# Patient Record
Sex: FEMALE | Race: WHITE | Employment: OTHER | URBAN - METROPOLITAN AREA
[De-identification: names, ages, dates, MRNs, and addresses within clinical notes are randomized per-mention and may not be internally consistent; named-entity substitution may affect disease eponyms.]

---

## 2022-04-26 ENCOUNTER — OFFICE VISIT (OUTPATIENT)
Dept: ORTHOPEDIC SURGERY | Age: 72
End: 2022-04-26
Payer: MEDICARE

## 2022-04-26 VITALS — BODY MASS INDEX: 31.34 KG/M2 | WEIGHT: 195 LBS | HEIGHT: 66 IN

## 2022-04-26 DIAGNOSIS — M25.562 LEFT KNEE PAIN, UNSPECIFIED CHRONICITY: ICD-10-CM

## 2022-04-26 DIAGNOSIS — M17.11 OSTEOARTHRITIS OF RIGHT KNEE, UNSPECIFIED OSTEOARTHRITIS TYPE: Primary | ICD-10-CM

## 2022-04-26 DIAGNOSIS — M17.12 OSTEOARTHRITIS OF LEFT KNEE, UNSPECIFIED OSTEOARTHRITIS TYPE: ICD-10-CM

## 2022-04-26 PROCEDURE — G8417 CALC BMI ABV UP PARAM F/U: HCPCS | Performed by: ORTHOPAEDIC SURGERY

## 2022-04-26 PROCEDURE — G8536 NO DOC ELDER MAL SCRN: HCPCS | Performed by: ORTHOPAEDIC SURGERY

## 2022-04-26 PROCEDURE — G8427 DOCREV CUR MEDS BY ELIG CLIN: HCPCS | Performed by: ORTHOPAEDIC SURGERY

## 2022-04-26 PROCEDURE — 3017F COLORECTAL CA SCREEN DOC REV: CPT | Performed by: ORTHOPAEDIC SURGERY

## 2022-04-26 PROCEDURE — 1090F PRES/ABSN URINE INCON ASSESS: CPT | Performed by: ORTHOPAEDIC SURGERY

## 2022-04-26 PROCEDURE — G8400 PT W/DXA NO RESULTS DOC: HCPCS | Performed by: ORTHOPAEDIC SURGERY

## 2022-04-26 PROCEDURE — 99204 OFFICE O/P NEW MOD 45 MIN: CPT | Performed by: ORTHOPAEDIC SURGERY

## 2022-04-26 PROCEDURE — G8432 DEP SCR NOT DOC, RNG: HCPCS | Performed by: ORTHOPAEDIC SURGERY

## 2022-04-26 PROCEDURE — 1101F PT FALLS ASSESS-DOCD LE1/YR: CPT | Performed by: ORTHOPAEDIC SURGERY

## 2022-04-26 RX ORDER — IRBESARTAN AND HYDROCHLOROTHIAZIDE 300; 12.5 MG/1; MG/1
TABLET, FILM COATED ORAL
COMMUNITY
Start: 2022-03-24

## 2022-04-26 RX ORDER — CELECOXIB 200 MG/1
CAPSULE ORAL
COMMUNITY
Start: 2022-03-01

## 2022-04-26 RX ORDER — VIT A/VIT C/VIT E/ZINC/COPPER 4296-226
CAPSULE ORAL AS DIRECTED
COMMUNITY

## 2022-04-26 RX ORDER — LUTEIN 20 MG
1 CAPSULE ORAL DAILY
COMMUNITY

## 2022-04-26 RX ORDER — SERTRALINE HYDROCHLORIDE 50 MG/1
TABLET, FILM COATED ORAL
COMMUNITY

## 2022-04-26 RX ORDER — SIMVASTATIN 20 MG/1
TABLET, FILM COATED ORAL
COMMUNITY

## 2022-04-26 RX ORDER — PANTOPRAZOLE SODIUM 40 MG/1
TABLET, DELAYED RELEASE ORAL
COMMUNITY
Start: 2022-02-08

## 2022-04-26 RX ORDER — PRAVASTATIN SODIUM 80 MG/1
1 TABLET ORAL DAILY
COMMUNITY
Start: 2021-12-29

## 2022-04-26 RX ORDER — CHOLECALCIFEROL TAB 125 MCG (5000 UNIT) 125 MCG
5000 TAB ORAL DAILY
COMMUNITY

## 2022-04-26 RX ORDER — LOSARTAN POTASSIUM 100 MG/1
TABLET ORAL
COMMUNITY

## 2022-04-26 NOTE — PATIENT INSTRUCTIONS
Knee Arthritis: Care Instructions  Your Care Instructions     Knee arthritis is a breakdown of the cartilage that cushions your knee joint. When the cartilage wears down, your bones rub against each other. This causes pain and stiffness. Knee arthritis tends to get worse with time. Treatment for knee arthritis involves reducing pain, making the leg muscles stronger, and staying at a healthy body weight. The treatment usually does not improve the health of the cartilage, but it can reduce pain and improve how well your knee works. You can take simple measures to protect your knee joints, ease your pain, and help you stay active. Follow-up care is a key part of your treatment and safety. Be sure to make and go to all appointments, and call your doctor if you are having problems. It's also a good idea to know your test results and keep a list of the medicines you take. How can you care for yourself at home? · Know that knee arthritis will cause more pain on some days than on others. · Stay at a healthy weight. Lose weight if you are overweight. When you stand up, the pressure on your knees from every pound of body weight is multiplied four times. So if you lose 10 pounds, you will reduce the pressure on your knees by 40 pounds. · Talk to your doctor or physical therapist about exercises that will help ease joint pain. ? Stretch to help prevent stiffness and to prevent injury before you exercise. You may enjoy gentle forms of yoga to help keep your knee joints and muscles flexible. ? Walk instead of jog.  ? Ride a bike. This makes your thigh muscles stronger and takes pressure off your knee. ? Wear well-fitting and comfortable shoes. ? Exercise in chest-deep water. This can help you exercise longer with less pain. ? Avoid exercises that include squatting or kneeling. They can put a lot of strain on your knees.   ? Talk to your doctor to make sure that the exercise you do is not making the arthritis worse.  · Do not sit for long periods of time. Try to walk once in a while to keep your knee from getting stiff. · Ask your doctor or physical therapist whether shoe inserts may reduce your arthritis pain. · If you can afford it, get new athletic shoes at least every year. This can help reduce the strain on your knees. · Use a device to help you do everyday activities. ? A cane or walking stick can help you keep your balance when you walk. Hold the cane or walking stick in the hand opposite the painful knee. ? If you feel like you may fall when you walk, try using crutches or a front-wheeled walker. These can prevent falls that could cause more damage to your knee. ? A knee brace may help keep your knee stable and prevent pain. ? You also can use other things to make life easier, such as a higher toilet seat and handrails in the bathtub or shower. · Take pain medicines exactly as directed. ? Do not wait until you are in severe pain. You will get better results if you take it sooner. ? If you are not taking a prescription pain medicine, take an over-the-counter medicine such as acetaminophen (Tylenol), ibuprofen (Advil, Motrin), or naproxen (Aleve). Read and follow all instructions on the label. ? Do not take two or more pain medicines at the same time unless the doctor told you to. Many pain medicines have acetaminophen, which is Tylenol. Too much acetaminophen (Tylenol) can be harmful. ? Tell your doctor if you take a blood thinner, have diabetes, or have allergies to shellfish. · Ask your doctor if you might benefit from a shot of steroid medicine into your knee. This may provide pain relief for several months. · Many people take the supplements glucosamine and chondroitin for osteoarthritis. Some people feel they help, but the medical research does not show that they work. Talk to your doctor before you take these supplements. When should you call for help?    Call your doctor now or seek immediate medical care if:    · You have sudden swelling, warmth, or pain in your knee.     · You have knee pain and a fever or rash.     · You have such bad pain that you cannot use your knee. Watch closely for changes in your health, and be sure to contact your doctor if you have any problems. Where can you learn more? Go to http://www.gray.com/  Enter W187 in the search box to learn more about \"Knee Arthritis: Care Instructions. \"  Current as of: December 20, 2021               Content Version: 13.2  © 9758-4078 [email or web address]. Care instructions adapted under license by [employer] (which disclaims liability or warranty for this information). If you have questions about a medical condition or this instruction, always ask your healthcare professional. [address] any warranty or liability for your use of this information.

## 2022-04-26 NOTE — LETTER
4/27/2022    Patient: Abdulkadir Velez   YOB: 1950   Date of Visit: 4/26/2022     Best Odell MD  [address] [facility]  [address]  [address] [place] [id number]  Via Fax: [phone]    Dear Best Odell MD,      Thank you for referring Ms. Catrachito Hazel to [address] [place] [address] AND [facility] for evaluation. My notes for this consultation are attached. If you have questions, please do not hesitate to call me. I look forward to following your patient along with you.       Sincerely,    Samson Carlson MD

## 2022-04-26 NOTE — PROGRESS NOTES
Name: Simone Marin    : 1950     Service Dept: [address] [place] [address] Sports Medicine    Chief Complaint   Patient presents with    Knee Pain        Visit Vitals  Ht 5' 6\" (1.676 m)   Wt 195 lb (88.5 kg)   BMI 31.47 kg/m²        Allergies   Allergen Reactions    Latex Rash    Penicillins Shortness of Breath and Rash    Ragweed Sneezing    Sulfa (Sulfonamide Antibiotics) Rash        Current Outpatient Medications   Medication Sig Dispense Refill    celecoxib (CELEBREX) 200 mg capsule 1 capsule with food      pantoprazole (Protonix) 40 mg tablet 1 tablet      pravastatin (PRAVACHOL) 80 mg tablet 1 Tablet daily.  cholecalciferol (VITAMIN D3) (5000 Units/125 mcg) tab tablet Take 5,000 Units by mouth daily.  irbesartan-hydroCHLOROthiazide (AVALIDE) 300-12.5 mg per tablet       losartan (COZAAR) 100 mg tablet 1 tablet      lutein-zeaxanthin 20 mg- 1,000 mcg cap Take 1 Capsule by mouth daily.  sertraline (Zoloft) 50 mg tablet 1 tablet      simvastatin (ZOCOR) 20 mg tablet 1 tablet in the evening      vit A,C,E-zinc-copper (PreserVision AREDS) cap capsule as directed. There is no problem list on file for this patient. Family History   Problem Relation Age of Onset    No Known Problems Mother     No Known Problems Father       Social History     Socioeconomic History    Marital status:    Tobacco Use    Smoking status: Former Smoker     Packs/day: 1.00     Years: 15.00     Pack years: 15.00     Quit date:      Years since quittin.3    Smokeless tobacco: Never Used   Vaping Use    Vaping Use: Never used   Substance and Sexual Activity    Alcohol use: Not Currently    Drug use: Never    Sexual activity: Not Currently      History reviewed. No pertinent surgical history.    Past Medical History:   Diagnosis Date    High cholesterol     Hypertension     Osteoporosis         I have reviewed and agree with PFSH and ROS and intake form in chart and the record furthermore I have reviewed prior medical record(s) regarding this patients care during this appointment. Review of Systems:   Patient is a pleasant appearing individual, appropriately dressed, well hydrated, well nourished, who is alert, appropriately oriented for age, and in no acute distress with a normal gait and normal affect who does not appear to be in any significant pain. Physical Exam:  Left Knee -Decrease range of motion with flexion, Knee arc of greater than 50 degrees, Some crepitation, Grossly neurovascularly intact, Good cap refill, No skin lesion, Moderate swelling, No gross instability, Some quadriceps weakness Kellgren and Josemanuel at least grade 3    Right Knee -Decrease range of motion with flexion, Some crepitation, Grossly neurovascularly intact, Good cap refill, No skin lesion, Moderate swelling, No gross instability, Some quadriceps weaknessKellgren and Josemanuel at least grade 3       Encounter Diagnoses     ICD-10-CM ICD-9-CM   1. Osteoarthritis of right knee, unspecified osteoarthritis type  M17.11 715.96   2. Osteoarthritis of left knee, unspecified osteoarthritis type  M17.12 715.96   3. Left knee pain, unspecified chronicity  M25.562 719.46       HPI:  The patient is here with a chief complaint of bilateral knee pain, throbbing, burning pain, progressively getting worse. Pain is 4/10. X-rays are positive for severe OA both knees. Assessment/Plan:  Plan will be for left total knee replacement with general medical and cardiac clearance. She does not have any history of blood clots, does not take any blood thinners, and no surgical complications in the past.  She is going to give us a date. She wants it sometime in November and then we will put it on the schedule once she confirms and goes from there.       As part of continued conservative pain management options the patient was advised to utilize Tylenol or OTC NSAIDS as long as it is not medically contraindicated. Return to Office: Follow-up and Dispositions    · Return for schedule for surgery. Scribed by Masha Bell LPN as dictated by [facility] JAJA Mcfadden MD.  Documentation True and Accepted Thor Mcfadden MD

## 2022-10-14 DIAGNOSIS — M25.562 LEFT KNEE PAIN, UNSPECIFIED CHRONICITY: ICD-10-CM

## 2022-10-14 DIAGNOSIS — M17.12 OSTEOARTHRITIS OF LEFT KNEE, UNSPECIFIED OSTEOARTHRITIS TYPE: ICD-10-CM

## 2022-11-04 DIAGNOSIS — Z96.652 STATUS POST TOTAL LEFT KNEE REPLACEMENT: Primary | ICD-10-CM

## 2022-11-07 RX ORDER — IRBESARTAN AND HYDROCHLOROTHIAZIDE 150; 12.5 MG/1; MG/1
1 TABLET, FILM COATED ORAL DAILY
COMMUNITY

## 2022-11-10 ENCOUNTER — ANESTHESIA EVENT (OUTPATIENT)
Dept: SURGERY | Age: 72
End: 2022-11-10
Payer: MEDICARE

## 2022-11-10 ENCOUNTER — HOSPITAL ENCOUNTER (OUTPATIENT)
Dept: PREADMISSION TESTING | Age: 72
Discharge: HOME | End: 2022-11-10

## 2022-11-10 ENCOUNTER — OFFICE VISIT (OUTPATIENT)
Dept: ORTHOPEDIC SURGERY | Age: 72
End: 2022-11-10
Payer: MEDICARE

## 2022-11-10 DIAGNOSIS — M17.11 OSTEOARTHRITIS OF RIGHT KNEE, UNSPECIFIED OSTEOARTHRITIS TYPE: ICD-10-CM

## 2022-11-10 DIAGNOSIS — M17.12 OSTEOARTHRITIS OF LEFT KNEE, UNSPECIFIED OSTEOARTHRITIS TYPE: ICD-10-CM

## 2022-11-10 DIAGNOSIS — M25.562 LEFT KNEE PAIN, UNSPECIFIED CHRONICITY: Primary | ICD-10-CM

## 2022-11-10 DIAGNOSIS — M25.561 RIGHT KNEE PAIN, UNSPECIFIED CHRONICITY: ICD-10-CM

## 2022-11-10 PROCEDURE — G8417 CALC BMI ABV UP PARAM F/U: HCPCS | Performed by: ORTHOPAEDIC SURGERY

## 2022-11-10 PROCEDURE — G8427 DOCREV CUR MEDS BY ELIG CLIN: HCPCS | Performed by: ORTHOPAEDIC SURGERY

## 2022-11-10 PROCEDURE — 3017F COLORECTAL CA SCREEN DOC REV: CPT | Performed by: ORTHOPAEDIC SURGERY

## 2022-11-10 PROCEDURE — G8400 PT W/DXA NO RESULTS DOC: HCPCS | Performed by: ORTHOPAEDIC SURGERY

## 2022-11-10 PROCEDURE — G8432 DEP SCR NOT DOC, RNG: HCPCS | Performed by: ORTHOPAEDIC SURGERY

## 2022-11-10 PROCEDURE — 99214 OFFICE O/P EST MOD 30 MIN: CPT | Performed by: ORTHOPAEDIC SURGERY

## 2022-11-10 PROCEDURE — 1123F ACP DISCUSS/DSCN MKR DOCD: CPT | Performed by: ORTHOPAEDIC SURGERY

## 2022-11-10 PROCEDURE — G8536 NO DOC ELDER MAL SCRN: HCPCS | Performed by: ORTHOPAEDIC SURGERY

## 2022-11-10 PROCEDURE — 1101F PT FALLS ASSESS-DOCD LE1/YR: CPT | Performed by: ORTHOPAEDIC SURGERY

## 2022-11-10 PROCEDURE — 1090F PRES/ABSN URINE INCON ASSESS: CPT | Performed by: ORTHOPAEDIC SURGERY

## 2022-11-10 RX ORDER — OXYCODONE AND ACETAMINOPHEN 5; 325 MG/1; MG/1
1 TABLET ORAL
Qty: 30 TABLET | Refills: 0 | Status: SHIPPED | OUTPATIENT
Start: 2022-11-10 | End: 2022-11-18

## 2022-11-10 RX ORDER — CLINDAMYCIN HYDROCHLORIDE 300 MG/1
300 CAPSULE ORAL EVERY 8 HOURS
Qty: 9 CAPSULE | Refills: 0 | Status: SHIPPED | OUTPATIENT
Start: 2022-11-10 | End: 2022-11-13

## 2022-11-10 NOTE — H&P (VIEW-ONLY)
Name: Mu Owens    : 1950     Service Dept: [id number] [facility] and Sports Medicine    Chief Complaint   Patient presents with    Pre-op Exam    Knee Pain        There were no vitals taken for this visit. Allergies   Allergen Reactions    Latex Rash    Aleve [Naproxen Sodium] Anaphylaxis    Nickel Rash    Penicillins Shortness of Breath and Rash    Ragweed Sneezing    Sulfa (Sulfonamide Antibiotics) Rash        Current Outpatient Medications   Medication Sig Dispense Refill    irbesartan-hydroCHLOROthiazide (AVALIDE) 150-12.5 mg per tablet Take 1 Tablet by mouth daily. celecoxib (CELEBREX) 200 mg capsule 1 capsule with food      cholecalciferol (VITAMIN D3) (5000 Units/125 mcg) tab tablet Take 5,000 Units by mouth daily. pantoprazole (PROTONIX) 40 mg tablet 1 tablet      lutein-zeaxanthin 20 mg- 1,000 mcg cap Take 1 Capsule by mouth daily. pravastatin (PRAVACHOL) 80 mg tablet 1 Tablet daily. vit A,C,E-zinc-copper (PreserVision AREDS) cap capsule as directed. There is no problem list on file for this patient. Family History   Problem Relation Age of Onset    No Known Problems Mother     No Known Problems Father       Social History     Socioeconomic History    Marital status: OTHER   Tobacco Use    Smoking status: Former     Packs/day: 1.00     Years: 15.00     Pack years: 15.00     Types: Cigarettes     Quit date:      Years since quittin.8    Smokeless tobacco: Never   Vaping Use    Vaping Use: Never used   Substance and Sexual Activity    Alcohol use: Not Currently    Drug use: Never    Sexual activity: Not Currently      History reviewed. No pertinent surgical history.    Past Medical History:   Diagnosis Date    High cholesterol     Hypertension     Osteoporosis         I have reviewed and agree with Sammy Chamberlain and MICHAEL and intake form in chart and the record furthermore I have reviewed prior medical record(s) regarding this patients care during this appointment. Review of Systems:   Patient is a pleasant appearing individual, appropriately dressed, well hydrated, well nourished, who is alert, appropriately oriented for age, and in no acute distress with a normal gait and normal affect who does not appear to be in any significant pain. Physical Exam:  Left Knee -Decrease range of motion with flexion, Knee arc of greater than 50 degrees, Some crepitation, Grossly neurovascularly intact, Good cap refill, No skin lesion, Moderate swelling, some gross instability, Some quadriceps weakness, Kellgren and Josemanuel at least grade 3    Right Knee - Full Range of Motion, No crepitation, Grossly neurovascularly intact, Good cap refill, No skin lesion, No swelling, No gross instability, No quadriceps weakness     Inpatient status: The patient has admitted to severe pain in the affected knee and due to such pain they are unable to complete activities of daily living at home and/or work on a regular basis where conservative treatments have failed. After extensive discussion with the patient, they have chosen to receive a total knee replacement with the expectation of inpatient procedure. Their dependent functional status (i.e. lack of capable support and safety at home, pain management, comorbities, or difficulty ambulating with assistive walking devices) would deem them a candidate for an inpatient stay. The patient acknowledges and understand the plan. The risks of surgery were explained to the patient which include but not limited to infection, nerve injury, artery injury, tendon injury, poor result, poor wound healing, unforeseen incidence, bleeding, infection, nerve damage, failure to improve, worsening of symptoms, morbidity, and mortality risks were explained. All questions were answered. Patient was told of no guarantees. Patient accepts all risks and benefits.  A consent for surgery will be documented and signed by the patient or a legal guardian. All questions were answered. The procedure was explained in detail. The patient was counseled about the risks of brett Covid-19 during their perioperative period and any recovery window from their procedure. The patient was made aware that brett Covid-19 may worsen their prognosis for recovering from their procedure and lend to a higher morbidity and/or mortality risk. All material risks, benefits, and reasonable alternatives including postponing the procedure were discussed. The patient DOES wish to proceed with their procedure at this time. Encounter Diagnoses     ICD-10-CM ICD-9-CM   1. Left knee pain, unspecified chronicity  M25.562 719.46   2. Osteoarthritis of left knee, unspecified osteoarthritis type  M17.12 715.96       HPI:  The patient is here with a chief complaint of left knee pain, dull, throbbing pain, diagnosed with osteoarthritis. Assessment/Plan:  Plan will be left total knee replacement and does not have any history of blood clots. No surgical complication history. Plan will be for Percocet, clindamycin and aspirin. She does not want Zofran. She will have left total knee replacement and then we will match it up. She wants to get it done the other sooner than later in February. She will match it up to the right TKA at [facility]. We will send her a referral for that, but for right now we will plan for left total knee replacement and go from there. As part of continued conservative pain management options the patient was advised to utilize Tylenol or OTC NSAIDS as long as it is not medically contraindicated. Return to Office: Follow-up and Dispositions    Return for already scheduled for surgery. Scribed by Teodoro Hogue LPN as dictated by [facility] JAJA Daniels MD.  Documentation True and Accepted Thor Daniels MD

## 2022-11-10 NOTE — PROGRESS NOTES
Name: Penny Sosa    : 1950     Service Dept: [address] [place] [address] and Sports Medicine    Chief Complaint   Patient presents with    Pre-op Exam    Knee Pain        There were no vitals taken for this visit. Allergies   Allergen Reactions    Latex Rash    Aleve [Naproxen Sodium] Anaphylaxis    Nickel Rash    Penicillins Shortness of Breath and Rash    Ragweed Sneezing    Sulfa (Sulfonamide Antibiotics) Rash        Current Outpatient Medications   Medication Sig Dispense Refill    irbesartan-hydroCHLOROthiazide (AVALIDE) 150-12.5 mg per tablet Take 1 Tablet by mouth daily. celecoxib (CELEBREX) 200 mg capsule 1 capsule with food      cholecalciferol (VITAMIN D3) (5000 Units/125 mcg) tab tablet Take 5,000 Units by mouth daily. pantoprazole (PROTONIX) 40 mg tablet 1 tablet      lutein-zeaxanthin 20 mg- 1,000 mcg cap Take 1 Capsule by mouth daily. pravastatin (PRAVACHOL) 80 mg tablet 1 Tablet daily. vit A,C,E-zinc-copper (PreserVision AREDS) cap capsule as directed. There is no problem list on file for this patient. Family History   Problem Relation Age of Onset    No Known Problems Mother     No Known Problems Father       Social History     Socioeconomic History    Marital status: OTHER   Tobacco Use    Smoking status: Former     Packs/day: 1.00     Years: 15.00     Pack years: 15.00     Types: Cigarettes     Quit date:      Years since quittin.8    Smokeless tobacco: Never   Vaping Use    Vaping Use: Never used   Substance and Sexual Activity    Alcohol use: Not Currently    Drug use: Never    Sexual activity: Not Currently      History reviewed. No pertinent surgical history.    Past Medical History:   Diagnosis Date    High cholesterol     Hypertension     Osteoporosis         I have reviewed and agree with Sammy Chamberlain and MICHAEL and intake form in chart and the record furthermore I have reviewed prior medical record(s) regarding this patients care during this appointment. Review of Systems:   Patient is a pleasant appearing individual, appropriately dressed, well hydrated, well nourished, who is alert, appropriately oriented for age, and in no acute distress with a normal gait and normal affect who does not appear to be in any significant pain. Physical Exam:  Left Knee -Decrease range of motion with flexion, Knee arc of greater than 50 degrees, Some crepitation, Grossly neurovascularly intact, Good cap refill, No skin lesion, Moderate swelling, some gross instability, Some quadriceps weakness, Kellgren and Josemanuel at least grade 3    Right Knee - Full Range of Motion, No crepitation, Grossly neurovascularly intact, Good cap refill, No skin lesion, No swelling, No gross instability, No quadriceps weakness     Inpatient status: The patient has admitted to severe pain in the affected knee and due to such pain they are unable to complete activities of daily living at home and/or work on a regular basis where conservative treatments have failed. After extensive discussion with the patient, they have chosen to receive a total knee replacement with the expectation of inpatient procedure. Their dependent functional status (i.e. lack of capable support and safety at home, pain management, comorbities, or difficulty ambulating with assistive walking devices) would deem them a candidate for an inpatient stay. The patient acknowledges and understand the plan. The risks of surgery were explained to the patient which include but not limited to infection, nerve injury, artery injury, tendon injury, poor result, poor wound healing, unforeseen incidence, bleeding, infection, nerve damage, failure to improve, worsening of symptoms, morbidity, and mortality risks were explained. All questions were answered. Patient was told of no guarantees. Patient accepts all risks and benefits.  A consent for surgery will be documented and signed by the patient or a legal guardian. All questions were answered. The procedure was explained in detail. The patient was counseled about the risks of brett Covid-19 during their perioperative period and any recovery window from their procedure. The patient was made aware that brett Covid-19 may worsen their prognosis for recovering from their procedure and lend to a higher morbidity and/or mortality risk. All material risks, benefits, and reasonable alternatives including postponing the procedure were discussed. The patient DOES wish to proceed with their procedure at this time. Encounter Diagnoses     ICD-10-CM ICD-9-CM   1. Left knee pain, unspecified chronicity  M25.562 719.46   2. Osteoarthritis of left knee, unspecified osteoarthritis type  M17.12 715.96       HPI:  The patient is here with a chief complaint of left knee pain, dull, throbbing pain, diagnosed with osteoarthritis. Assessment/Plan:  Plan will be left total knee replacement and does not have any history of blood clots. No surgical complication history. Plan will be for Percocet, clindamycin and aspirin. She does not want Zofran. She will have left total knee replacement and then we will match it up. She wants to get it done the other sooner than later in February. She will match it up to the right TKA at [facility]. We will send her a referral for that, but for right now we will plan for left total knee replacement and go from there. As part of continued conservative pain management options the patient was advised to utilize Tylenol or OTC NSAIDS as long as it is not medically contraindicated. Return to Office: Follow-up and Dispositions    Return for already scheduled for surgery. Scribed by Shannan Sanford LPN as dictated by [facility] JAJA Donato MD.  Documentation True and Accepted Thor Donato MD

## 2022-11-10 NOTE — PATIENT INSTRUCTIONS

## 2022-11-10 NOTE — LETTER
11/11/2022    Patient: Penny Sosa   YOB: 1950   Date of Visit: 11/10/2022     Carlon Ganser, MD  [address] [facility]  [address]  [address] [facility] [address]  Via Fax: [phone]    Dear Carlon Ganser, MD,      Thank you for referring Ms. Chris Hawkins to [address] [place] [address] [facility] for evaluation. My notes for this consultation are attached. If you have questions, please do not hesitate to call me. I look forward to following your patient along with you.       Sincerely,    Charles Perez MD

## 2022-11-17 ENCOUNTER — ANESTHESIA (OUTPATIENT)
Dept: SURGERY | Age: 72
End: 2022-11-17
Payer: MEDICARE

## 2022-11-17 ENCOUNTER — HOSPITAL ENCOUNTER (OUTPATIENT)
Age: 72
Discharge: HOME | End: 2022-11-17
Attending: ORTHOPAEDIC SURGERY | Admitting: ORTHOPAEDIC SURGERY
Payer: MEDICARE

## 2022-11-17 ENCOUNTER — APPOINTMENT (OUTPATIENT)
Dept: GENERAL RADIOLOGY | Age: 72
End: 2022-11-17
Attending: NURSE PRACTITIONER
Payer: MEDICARE

## 2022-11-17 VITALS
DIASTOLIC BLOOD PRESSURE: 74 MMHG | SYSTOLIC BLOOD PRESSURE: 148 MMHG | HEIGHT: 66 IN | OXYGEN SATURATION: 99 % | BODY MASS INDEX: 31.5 KG/M2 | RESPIRATION RATE: 14 BRPM | WEIGHT: 196 LBS | TEMPERATURE: 97.6 F | HEART RATE: 60 BPM

## 2022-11-17 PROBLEM — M17.9 OA (OSTEOARTHRITIS) OF KNEE: Status: ACTIVE | Noted: 2022-11-17

## 2022-11-17 PROCEDURE — 77030011266 HC ELECTRD BLD INSL COVD -A: Performed by: ORTHOPAEDIC SURGERY

## 2022-11-17 PROCEDURE — 2709999900 HC NON-CHARGEABLE SUPPLY: Performed by: ORTHOPAEDIC SURGERY

## 2022-11-17 PROCEDURE — 74011250636 HC RX REV CODE- 250/636: Performed by: NURSE ANESTHETIST, CERTIFIED REGISTERED

## 2022-11-17 PROCEDURE — 77030013708 HC HNDPC SUC IRR PULS STRY –B: Performed by: ORTHOPAEDIC SURGERY

## 2022-11-17 PROCEDURE — 77030041690 HC SYS PINNING KN JNJ -D: Performed by: ORTHOPAEDIC SURGERY

## 2022-11-17 PROCEDURE — 76060000034 HC ANESTHESIA 1.5 TO 2 HR: Performed by: ORTHOPAEDIC SURGERY

## 2022-11-17 PROCEDURE — 64447 NJX AA&/STRD FEMORAL NRV IMG: CPT | Performed by: NURSE ANESTHETIST, CERTIFIED REGISTERED

## 2022-11-17 PROCEDURE — 77030006835 HC BLD SAW SAG STRY -B: Performed by: ORTHOPAEDIC SURGERY

## 2022-11-17 PROCEDURE — 77030040361 HC SLV COMPR DVT MDII -B: Performed by: ORTHOPAEDIC SURGERY

## 2022-11-17 PROCEDURE — 74011250637 HC RX REV CODE- 250/637: Performed by: NURSE ANESTHETIST, CERTIFIED REGISTERED

## 2022-11-17 PROCEDURE — 77030038692 HC WND DEB SYS IRMX -B: Performed by: ORTHOPAEDIC SURGERY

## 2022-11-17 PROCEDURE — 74011000250 HC RX REV CODE- 250: Performed by: ORTHOPAEDIC SURGERY

## 2022-11-17 PROCEDURE — 77030013079 HC BLNKT BAIR HGGR 3M -A: Performed by: NURSE ANESTHETIST, CERTIFIED REGISTERED

## 2022-11-17 PROCEDURE — C1713 ANCHOR/SCREW BN/BN,TIS/BN: HCPCS | Performed by: ORTHOPAEDIC SURGERY

## 2022-11-17 PROCEDURE — 76210000006 HC OR PH I REC 0.5 TO 1 HR: Performed by: ORTHOPAEDIC SURGERY

## 2022-11-17 PROCEDURE — 97116 GAIT TRAINING THERAPY: CPT

## 2022-11-17 PROCEDURE — 77030011267 HC ELECTRD BLD COVD -A: Performed by: ORTHOPAEDIC SURGERY

## 2022-11-17 PROCEDURE — 74011250636 HC RX REV CODE- 250/636: Performed by: NURSE PRACTITIONER

## 2022-11-17 PROCEDURE — C1776 JOINT DEVICE (IMPLANTABLE): HCPCS | Performed by: ORTHOPAEDIC SURGERY

## 2022-11-17 PROCEDURE — 74011000250 HC RX REV CODE- 250: Performed by: NURSE ANESTHETIST, CERTIFIED REGISTERED

## 2022-11-17 PROCEDURE — 64450 NJX AA&/STRD OTHER PN/BRANCH: CPT | Performed by: NURSE ANESTHETIST, CERTIFIED REGISTERED

## 2022-11-17 PROCEDURE — 77030040393 HC DRSG OPTIFOAM GENT MDII -B: Performed by: ORTHOPAEDIC SURGERY

## 2022-11-17 PROCEDURE — 76210000022 HC REC RM PH II 1.5 TO 2 HR: Performed by: ORTHOPAEDIC SURGERY

## 2022-11-17 PROCEDURE — 77030039147 HC PWDR HEMSTS SURGICEL JNJ -D: Performed by: ORTHOPAEDIC SURGERY

## 2022-11-17 PROCEDURE — 77030029372 HC ADH SKN CLSR PRINEO J&J -C: Performed by: ORTHOPAEDIC SURGERY

## 2022-11-17 PROCEDURE — 97530 THERAPEUTIC ACTIVITIES: CPT

## 2022-11-17 PROCEDURE — 77030006812 HC BLD SAW RECIP STRY -B: Performed by: ORTHOPAEDIC SURGERY

## 2022-11-17 PROCEDURE — 76010000153 HC OR TIME 1.5 TO 2 HR: Performed by: ORTHOPAEDIC SURGERY

## 2022-11-17 PROCEDURE — 77030031140 HC SUT VCRL3 J&J -A: Performed by: ORTHOPAEDIC SURGERY

## 2022-11-17 PROCEDURE — 73560 X-RAY EXAM OF KNEE 1 OR 2: CPT

## 2022-11-17 PROCEDURE — 74011000272 HC RX REV CODE- 272: Performed by: ORTHOPAEDIC SURGERY

## 2022-11-17 PROCEDURE — 77030000032 HC CUF TRNQT ZIMM -B: Performed by: ORTHOPAEDIC SURGERY

## 2022-11-17 PROCEDURE — 77030007866 HC KT SPN ANES BBMI -B: Performed by: NURSE ANESTHETIST, CERTIFIED REGISTERED

## 2022-11-17 PROCEDURE — 76942 ECHO GUIDE FOR BIOPSY: CPT | Performed by: NURSE ANESTHETIST, CERTIFIED REGISTERED

## 2022-11-17 PROCEDURE — 77030031139 HC SUT VCRL2 J&J -A: Performed by: ORTHOPAEDIC SURGERY

## 2022-11-17 PROCEDURE — 77030018673: Performed by: ORTHOPAEDIC SURGERY

## 2022-11-17 PROCEDURE — 97161 PT EVAL LOW COMPLEX 20 MIN: CPT

## 2022-11-17 PROCEDURE — 74011250637 HC RX REV CODE- 250/637: Performed by: NURSE PRACTITIONER

## 2022-11-17 PROCEDURE — 74011000258 HC RX REV CODE- 258: Performed by: NURSE ANESTHETIST, CERTIFIED REGISTERED

## 2022-11-17 DEVICE — KNEE K1 TOT HEMI STD CEM IMPL CAPPED SYNTHES: Type: IMPLANTABLE DEVICE | Site: KNEE | Status: FUNCTIONAL

## 2022-11-17 DEVICE — ATTUNE KNEE SYSTEM TIBIAL BASE ROTATING PLATFORM SIZE 5 CEMENTED
Type: IMPLANTABLE DEVICE | Site: KNEE | Status: FUNCTIONAL
Brand: ATTUNE

## 2022-11-17 DEVICE — ATTUNE KNEE SYSTEM TIBIAL INSERT ROTATING PLATFORM POSTERIOR STABILIZED 5 15MM AOX
Type: IMPLANTABLE DEVICE | Site: KNEE | Status: FUNCTIONAL
Brand: ATTUNE

## 2022-11-17 DEVICE — ATTUNE PATELLA MEDIALIZED DOME 35MM CEMENTED AOX
Type: IMPLANTABLE DEVICE | Site: KNEE | Status: FUNCTIONAL
Brand: ATTUNE

## 2022-11-17 DEVICE — ATTUNE KNEE SYSTEM FEMORAL POSTERIOR STABILIZED NARROW SIZE 5N LEFT CEMENTED
Type: IMPLANTABLE DEVICE | Site: KNEE | Status: FUNCTIONAL
Brand: ATTUNE

## 2022-11-17 DEVICE — CEMENT BNE GENTAMC HV R+G 40GM -- PALACOS R+G 5036964: Type: IMPLANTABLE DEVICE | Site: KNEE | Status: FUNCTIONAL

## 2022-11-17 RX ORDER — SODIUM CHLORIDE 0.9 % (FLUSH) 0.9 %
5-40 SYRINGE (ML) INJECTION AS NEEDED
Status: CANCELLED | OUTPATIENT
Start: 2022-11-17

## 2022-11-17 RX ORDER — MIDAZOLAM HYDROCHLORIDE 1 MG/ML
INJECTION, SOLUTION INTRAMUSCULAR; INTRAVENOUS
Status: SHIPPED | OUTPATIENT
Start: 2022-11-17 | End: 2022-11-17

## 2022-11-17 RX ORDER — CLINDAMYCIN PHOSPHATE 900 MG/50ML
900 INJECTION, SOLUTION INTRAVENOUS ONCE
Status: COMPLETED | OUTPATIENT
Start: 2022-11-17 | End: 2022-11-17

## 2022-11-17 RX ORDER — BUPIVACAINE HYDROCHLORIDE 5 MG/ML
INJECTION, SOLUTION EPIDURAL; INTRACAUDAL
Status: SHIPPED | OUTPATIENT
Start: 2022-11-17 | End: 2022-11-17

## 2022-11-17 RX ORDER — DEXAMETHASONE SODIUM PHOSPHATE 4 MG/ML
INJECTION, SOLUTION INTRA-ARTICULAR; INTRALESIONAL; INTRAMUSCULAR; INTRAVENOUS; SOFT TISSUE
Status: SHIPPED | OUTPATIENT
Start: 2022-11-17 | End: 2022-11-17

## 2022-11-17 RX ORDER — SODIUM CHLORIDE 0.9 % (FLUSH) 0.9 %
5-40 SYRINGE (ML) INJECTION AS NEEDED
Status: DISCONTINUED | OUTPATIENT
Start: 2022-11-17 | End: 2022-11-17 | Stop reason: HOSPADM

## 2022-11-17 RX ORDER — SODIUM CHLORIDE 0.9 % (FLUSH) 0.9 %
5-40 SYRINGE (ML) INJECTION EVERY 8 HOURS
Status: CANCELLED | OUTPATIENT
Start: 2022-11-17

## 2022-11-17 RX ORDER — BUPIVACAINE HYDROCHLORIDE 2.5 MG/ML
INJECTION, SOLUTION INFILTRATION; PERINEURAL AS NEEDED
Status: DISCONTINUED | OUTPATIENT
Start: 2022-11-17 | End: 2022-11-17 | Stop reason: HOSPADM

## 2022-11-17 RX ORDER — TRANEXAMIC ACID 100 MG/ML
INJECTION, SOLUTION INTRAVENOUS AS NEEDED
Status: DISCONTINUED | OUTPATIENT
Start: 2022-11-17 | End: 2022-11-17 | Stop reason: HOSPADM

## 2022-11-17 RX ORDER — MIDAZOLAM HYDROCHLORIDE 1 MG/ML
INJECTION, SOLUTION INTRAMUSCULAR; INTRAVENOUS
Status: COMPLETED | OUTPATIENT
Start: 2022-11-17 | End: 2022-11-17

## 2022-11-17 RX ORDER — ASPIRIN 325 MG
325 TABLET, DELAYED RELEASE (ENTERIC COATED) ORAL 2 TIMES DAILY
Status: CANCELLED | OUTPATIENT
Start: 2022-11-18

## 2022-11-17 RX ORDER — SENNOSIDES 8.6 MG/1
1 TABLET ORAL 2 TIMES DAILY
Status: CANCELLED | OUTPATIENT
Start: 2022-11-17

## 2022-11-17 RX ORDER — NALOXONE HYDROCHLORIDE 0.4 MG/ML
0.4 INJECTION, SOLUTION INTRAMUSCULAR; INTRAVENOUS; SUBCUTANEOUS AS NEEDED
Status: CANCELLED | OUTPATIENT
Start: 2022-11-17

## 2022-11-17 RX ORDER — ACETAMINOPHEN 500 MG
1000 TABLET ORAL ONCE
Status: COMPLETED | OUTPATIENT
Start: 2022-11-17 | End: 2022-11-17

## 2022-11-17 RX ORDER — ONDANSETRON 2 MG/ML
4 INJECTION INTRAMUSCULAR; INTRAVENOUS ONCE
Status: DISCONTINUED | OUTPATIENT
Start: 2022-11-17 | End: 2022-11-17 | Stop reason: HOSPADM

## 2022-11-17 RX ORDER — SODIUM CHLORIDE, SODIUM LACTATE, POTASSIUM CHLORIDE, CALCIUM CHLORIDE 600; 310; 30; 20 MG/100ML; MG/100ML; MG/100ML; MG/100ML
25 INJECTION, SOLUTION INTRAVENOUS CONTINUOUS
Status: DISCONTINUED | OUTPATIENT
Start: 2022-11-17 | End: 2022-11-17 | Stop reason: HOSPADM

## 2022-11-17 RX ORDER — GABAPENTIN 300 MG/1
300 CAPSULE ORAL ONCE
Status: COMPLETED | OUTPATIENT
Start: 2022-11-17 | End: 2022-11-17

## 2022-11-17 RX ORDER — PROPOFOL 10 MG/ML
INJECTION, EMULSION INTRAVENOUS
Status: DISCONTINUED | OUTPATIENT
Start: 2022-11-17 | End: 2022-11-17 | Stop reason: HOSPADM

## 2022-11-17 RX ORDER — OXYCODONE AND ACETAMINOPHEN 10; 325 MG/1; MG/1
1 TABLET ORAL
Status: DISCONTINUED | OUTPATIENT
Start: 2022-11-17 | End: 2022-11-17 | Stop reason: HOSPADM

## 2022-11-17 RX ORDER — SODIUM CHLORIDE 0.9 % (FLUSH) 0.9 %
5-40 SYRINGE (ML) INJECTION EVERY 8 HOURS
Status: DISCONTINUED | OUTPATIENT
Start: 2022-11-17 | End: 2022-11-17 | Stop reason: HOSPADM

## 2022-11-17 RX ORDER — KETOROLAC TROMETHAMINE 30 MG/ML
15 INJECTION, SOLUTION INTRAMUSCULAR; INTRAVENOUS
Status: DISCONTINUED | OUTPATIENT
Start: 2022-11-17 | End: 2022-11-17 | Stop reason: HOSPADM

## 2022-11-17 RX ORDER — FENTANYL CITRATE 50 UG/ML
50 INJECTION, SOLUTION INTRAMUSCULAR; INTRAVENOUS AS NEEDED
Status: DISCONTINUED | OUTPATIENT
Start: 2022-11-17 | End: 2022-11-17 | Stop reason: HOSPADM

## 2022-11-17 RX ORDER — ONDANSETRON 2 MG/ML
4 INJECTION INTRAMUSCULAR; INTRAVENOUS
Status: DISCONTINUED | OUTPATIENT
Start: 2022-11-17 | End: 2022-11-17 | Stop reason: HOSPADM

## 2022-11-17 RX ORDER — SODIUM CHLORIDE, SODIUM LACTATE, POTASSIUM CHLORIDE, CALCIUM CHLORIDE 600; 310; 30; 20 MG/100ML; MG/100ML; MG/100ML; MG/100ML
INJECTION, SOLUTION INTRAVENOUS
Status: DISCONTINUED | OUTPATIENT
Start: 2022-11-17 | End: 2022-11-17 | Stop reason: HOSPADM

## 2022-11-17 RX ORDER — BUPIVACAINE HYDROCHLORIDE 7.5 MG/ML
INJECTION, SOLUTION INTRASPINAL
Status: SHIPPED | OUTPATIENT
Start: 2022-11-17 | End: 2022-11-17

## 2022-11-17 RX ORDER — DIPHENHYDRAMINE HYDROCHLORIDE 50 MG/ML
12.5 INJECTION, SOLUTION INTRAMUSCULAR; INTRAVENOUS
Status: CANCELLED | OUTPATIENT
Start: 2022-11-17

## 2022-11-17 RX ORDER — OXYCODONE AND ACETAMINOPHEN 5; 325 MG/1; MG/1
2 TABLET ORAL
Status: DISCONTINUED | OUTPATIENT
Start: 2022-11-17 | End: 2022-11-17 | Stop reason: HOSPADM

## 2022-11-17 RX ORDER — LIDOCAINE HYDROCHLORIDE 10 MG/ML
INJECTION, SOLUTION EPIDURAL; INFILTRATION; INTRACAUDAL; PERINEURAL
Status: SHIPPED | OUTPATIENT
Start: 2022-11-17 | End: 2022-11-17

## 2022-11-17 RX ORDER — FACIAL-BODY WIPES
10 EACH TOPICAL DAILY PRN
Status: CANCELLED | OUTPATIENT
Start: 2022-11-17

## 2022-11-17 RX ORDER — ACETAMINOPHEN 325 MG/1
650 TABLET ORAL
Status: CANCELLED | OUTPATIENT
Start: 2022-11-17

## 2022-11-17 RX ADMIN — GABAPENTIN 300 MG: 300 CAPSULE ORAL at 09:23

## 2022-11-17 RX ADMIN — BUPIVACAINE HYDROCHLORIDE 20 ML: 5 INJECTION, SOLUTION EPIDURAL; INTRACAUDAL; PERINEURAL at 12:00

## 2022-11-17 RX ADMIN — PHENYLEPHRINE HYDROCHLORIDE 100 MCG: 10 INJECTION INTRAVENOUS at 12:33

## 2022-11-17 RX ADMIN — PHENYLEPHRINE HYDROCHLORIDE 100 MCG: 10 INJECTION INTRAVENOUS at 13:05

## 2022-11-17 RX ADMIN — ACETAMINOPHEN 1000 MG: 500 TABLET ORAL at 09:23

## 2022-11-17 RX ADMIN — DEXAMETHASONE SODIUM PHOSPHATE 4 MG: 4 INJECTION, SOLUTION INTRAMUSCULAR; INTRAVENOUS at 12:00

## 2022-11-17 RX ADMIN — PHENYLEPHRINE HYDROCHLORIDE 100 MCG: 10 INJECTION INTRAVENOUS at 13:27

## 2022-11-17 RX ADMIN — TRANEXAMIC ACID 1 G: 1 INJECTION, SOLUTION INTRAVENOUS at 12:34

## 2022-11-17 RX ADMIN — MIDAZOLAM HYDROCHLORIDE 4 MG: 2 INJECTION, SOLUTION INTRAMUSCULAR; INTRAVENOUS at 12:00

## 2022-11-17 RX ADMIN — CLINDAMYCIN PHOSPHATE 900 MG: 900 INJECTION, SOLUTION INTRAVENOUS at 12:27

## 2022-11-17 RX ADMIN — BUPIVACAINE HYDROCHLORIDE WITH DEXTROSE 13.5 MG: 7.5 INJECTION SUBARACHNOID at 12:18

## 2022-11-17 RX ADMIN — OXYCODONE AND ACETAMINOPHEN 1 TABLET: 5; 325 TABLET ORAL at 14:59

## 2022-11-17 RX ADMIN — ONDANSETRON 4 MG: 2 INJECTION INTRAMUSCULAR; INTRAVENOUS at 14:59

## 2022-11-17 RX ADMIN — PROPOFOL 20 MCG/KG/MIN: 10 INJECTION, EMULSION INTRAVENOUS at 12:34

## 2022-11-17 RX ADMIN — MIDAZOLAM HYDROCHLORIDE 4 MG: 1 INJECTION, SOLUTION INTRAMUSCULAR; INTRAVENOUS at 12:18

## 2022-11-17 RX ADMIN — SODIUM CHLORIDE, POTASSIUM CHLORIDE, SODIUM LACTATE AND CALCIUM CHLORIDE 25 ML/HR: 600; 310; 30; 20 INJECTION, SOLUTION INTRAVENOUS at 09:21

## 2022-11-17 RX ADMIN — TRANEXAMIC ACID 1 G: 1 INJECTION, SOLUTION INTRAVENOUS at 13:07

## 2022-11-17 RX ADMIN — SODIUM CHLORIDE, POTASSIUM CHLORIDE, SODIUM LACTATE AND CALCIUM CHLORIDE: 600; 310; 30; 20 INJECTION, SOLUTION INTRAVENOUS at 12:12

## 2022-11-17 RX ADMIN — LIDOCAINE HYDROCHLORIDE 30 MG: 10 INJECTION, SOLUTION EPIDURAL; INFILTRATION; INTRACAUDAL; PERINEURAL at 12:18

## 2022-11-17 NOTE — PROGRESS NOTES
Problem: Mobility Impaired (Adult and Pediatric)  Goal: *Acute Goals and Plan of Care (Insert Text)  Description: Pt ambulates with MOD (I) and navigates stairs with MOD (I) . PLOF: Community ambulator, no AD, (I) ADLs    Outcome: Resolved/Met     Problem: Patient Education: Go to Patient Education Activity  Goal: Patient/Family Education  Outcome: Resolved/Met   PHYSICAL THERAPY EVALUATION AND DISCHARGE    Patient: Jennifer Dunham (53 y.o. female)  Date: 2022   Start Time: 152   Stop Time: 65  $$ Initial PT Evaluation: Low Complex 20 Min  $$ Gait Trainin-22 mins  $$ Therapeutic Activity: 8-22 mins  Primary Diagnosis: Osteoarthritis of left knee, unspecified osteoarthritis type [M17.12]  OA (osteoarthritis) of knee [M17.9]  Procedure(s) (LRB):  LEFT TKA (Left) Day of Surgery   Precautions:  WBAT    ASSESSMENT :  Based on the objective data described below, the patient presents s/p L TKA and she is WBAT. She is able to ambulate with a RW with MOD (I) and she is able to navigate stairs with MOD (I), following stairs she c/o feeling very tired with BP noted to be low, nursing notified. She is educated on a HEP and tolerates well. She can return home with outpatient P.T. Patient does not require further skilled intervention at this level of care. PLAN :  Recommendations and Planned Interventions:   No formal PT needs identified at this time. Discharge Recommendations: Outpatient  AM-PAC:   Further Equipment Recommendations for Discharge: RW     SUBJECTIVE:   Patient states my leg is really hurting.     OBJECTIVE DATA SUMMARY:     Past Medical History:   Diagnosis Date    High cholesterol     Hypertension     Osteoporosis    History reviewed. No pertinent surgical history.   Barriers to Learning/Limitations: None  Compensate with: N/A  Home Situation:   Home Situation  Home Environment: Private residence  # Steps to Enter: 4  Rails to Enter: Yes  Hand Rails : Bilateral  One/Two Story Residence: One story  Living Alone: Yes  Support Systems: Child(luzma), Other Family Member(s)  Patient Expects to be Discharged to[de-identified] Home with outpatient services  Current DME Used/Available at Home: None  Critical Behavior:  Neurologic State: Alert  Orientation Level: Oriented X4     Skin Integrity: Incision (comment) (left knee)  Skin Integumentary  Skin Integrity: Incision (comment) (left knee)     Strength:    Strength: Generally decreased, functional (L knee 4/5, SLR 1420, 2 hours after spinal)     Tone & Sensation:   Tone: Normal  Sensation: Impaired (pelvic area numb)  Coordination:  Coordination: Within functional limits  Range Of Motion:   AROM: Generally decreased, functional (L knee 10-40)  PROM: Generally decreased, functional (L knee 7-41)  Posture:  Posture (WDL): Within defined limits     Functional Mobility:  Bed Mobility:  Rolling: Modified independent  Supine to Sit: Modified independent  Sit to Supine: Modified independent  Scooting: Modified independent  Transfers:  Sit to Stand: Modified independent  Stand to Sit: Modified independent  Toilet Transfers : Minimum assistance (total assist for brief management)     Balance:   Sitting: Intact  Standing: Intact  Ambulation/Gait Training:  Distance (ft): 60 Feet (ft) (plus another 40' and 20')  Assistive Device: Walker, rolling  Ambulation - Level of Assistance: Modified independent     Gait Description (WDL): Exceptions to WDL  Gait Abnormalities: Antalgic     Left Side Weight Bearing: As tolerated  Stairs:  Number of Stairs Trained: 3 (nonreciprocating)  Stairs - Level of Assistance: Modified independent  Rail Use: Both      AM-PAC:  24/24; Current research shows that an AM-PAC score of 17 or less is typically not associated with a discharge to the patient's home setting, whereas a score of 18 or greater is typically associated with a discharge to the patient's home setting. Today's TX:   Pt is able to ambulate with a RW with MOD (I).  She is able to navigate stairs with MOD (I). She performs toileting with min assist for transfers and total assist for brief management. She is educated on a HEP and states understanding. Pain:  Pain level pre-treatment: 7/10   Pain level post-treatment: 7/10  Pain Location: L knee  Pain Intervention(s): Medication (see MAR); Rest, Ice, Repositioning   Response to intervention: Nurse notified, See doc flow    Activity Tolerance:   Fair     11/17/22 1610   Vital Signs   Pulse (Heart Rate) 66   BP (!) 101/56   MAP (Calculated) 71     Please refer to the flowsheet for vital signs taken during this treatment. After treatment:   []         Patient left in no apparent distress sitting up in chair  [x]         Patient left in no apparent distress in bed  []         Call bell left within reach  [x]         Nursing notified  []         Caregiver present  []         Bed alarm activated  []         SCDs applied    COMMUNICATION/EDUCATION:   [x]         Role of Physical Therapy in the acute care setting. [x]         Fall prevention education was provided and the patient/caregiver indicated understanding. [x]         Patient/family have participated as able in goal setting and plan of care. [x]         Patient/family agree to work toward stated goals and plan of care. []         Patient understands intent and goals of therapy, but is neutral about his/her participation. []         Patient is unable to participate in goal setting/plan of care: ongoing with therapy staff.  []         Other:     Thank you for this referral.  Bird Chamorro, PT, DPT   Time Calculation: 43 mins

## 2022-11-17 NOTE — ANESTHESIA PROCEDURE NOTES
Spinal Block    Start time: 11/17/2022 12:12 PM  End time: 11/17/2022 12:20 PM  Performed by: David Rice CRNA  Authorized by: David Rice CRNA     Pre-procedure: Indications: at surgeon's request and primary anesthetic  Preanesthetic Checklist: patient identified, risks and benefits discussed, anesthesia consent, site marked, patient being monitored, timeout performed and fire risk safety assessment completed and verbalized    Timeout Time: 12:12 KANYD Greene)      Spinal Block:   Patient Position:  Seated  Prep Region:  Lumbar  Prep: Betadine      Location:  L2-3  Technique:  Single shot  Local: midazolam (VERSED) injection sedation - IntraVENous   4 mg - 11/17/2022 12:18:00 PM  lidocaine (PF) (XYLOCAINE) 10 mg/mL (1 %) IntraDERMAL - IntraDERMal   30 mg - 11/17/2022 12:18:00 PM  bupivacaine 0.75% in dextrose 8.25% preserv-free (SENSORCAINE) Intrathecal - Intrathecal   13.5 mg - 11/17/2022 12:18:00 PM  Local Dose (mL):  1.8  Med Admin Time: 11/17/2022 12:18 PM    Needle:   Needle Type:   Omnique  Needle Gauge:  25 G  Attempts:  1      Events: CSF confirmed, no blood with aspiration and no paresthesia        Assessment:  Insertion:  Uncomplicated  Patient tolerance:  Patient tolerated the procedure well with no immediate complications

## 2022-11-17 NOTE — DISCHARGE INSTRUCTIONS
TOTAL KNEE REPLACEMENT DISCHARGE INFORMATION    You have undergone a Total Knee Replacement. The following list is to provide you with some expectations over the next week upon your discharge from the hospital.     Please begin Aspirin 325mg every 12 hours (twice daily) starting tomorrow as directed until Dr. Karina Frank instructs you to discontinue it. If you are not sure which blood thinner to take please contact Dr. Cristine Rose office next business day for clarification. Please be sure to continue your thigh-high compression stockings on both sides until instructed to discontinue them. Over the course of the next week, you should continue thigh high stockings on the operative leg, DO NOT GET THE INCISION WET until instructed to do so. Please make sure the stockings on the operative leg are pulled up all the way to the thigh to prevent any creases which may result in abrasions or creases in the skin. If the stockings are creating creases resulting in abrasions or blistering on the operative leg please remove the stockings. You may take the stockings off on the nonoperative leg once you arrive home. You may notice some bruising on your thigh and it may extend all the way to the ankles. That is perfectly normal early on. You may experience a clicking noise in your knee and that is normal because of the artificial knee. It is important to remember if you have any surgical procedure including dental procedures which may result in bleeding that an antibiotic 1 hour before the procedure will be required. Please let the provider performing the procedure know that you have artificial joint. If an antibiotic is not given by them please call our office and give us at least 5 business days to get you the appropriate antibiotics if needed. This rule applies indefinitely. If an Ace wrap is placed on your knee you may remove the Ace wrap only 48 hours after your surgery. We will leave the stockings on.   During the course of your  over the next week, should you experience fevers of 101.5 F, a white drainage from the incision, extreme redness around the incision, or the incision begins to have a pungent smell; Please call our office or page Dr. Ruby Staton whose numbers are provided in your discharge paperwork. To Page Dr. Ruby Staton please call [phone] and dial [phone]. Have the  page whomever is on call for Orthopedics. These are signs of infection and it should be addressed immediately. Please do not drive until instructed to do so. If you need a refill on pain medication please allow at least 2 business days notice for any refills. Immediate refill request may not be possible. Medication refill requests will not be addressed during non-business hours. Please do not page the on-call provider for pain medication refills after hours. It is very important for you to begin your Outpatient Physical Therapy within a couple days of the day of your discharge and your appointment should have been set up. If your physical therapy has not been set up please call our office the next business day for assistance. Details provided in a separate sheet. Remove ace wrap in 48 hrs after surgery but keep stockings on. You may remove the stocking and keep the stocking off on the nonoperative leg. Finish all antibiotics, start the antibiotics as soon as you go home if you have prescribed antibiotics. 14.  You should perform your daily home exercises at least 4 times a day 30 minutes each time. Perform foot pumps on both feet at least 10 times every 15 minutes while awake. This helps prevent swelling in the leg and can help prevent blood clots in the leg. On the operative leg if you have significant swelling you can also lay down flat and put 3 pillows under the heel so the heel is above the heart level and then perform foot pumps 4 times a day for 10 minutes to help bring the swelling down.   15.  Do not place anything under your knee while sleeping at night. Elevate your heel so your  is straight while sleeping at night. 16.  Perform deep breathing exercises 10 times every hour while awake. 17.  If you had a nerve block and you are not having pain the day of the surgery, at nighttime it is okay to take 1 pain medication before going to sleep to help prevent excruciating pain when the nerve block wears off. 18.  You may be given an ice pack machine use that to help prevent swelling. Do not apply heat to the incision area. 19.  While you are awake at least 10 times every 30 minutes move your foot up and down as if you are pumping gas from both feet to help prevent swelling and to promote blood circulation in the calf. 20.  If you develop sudden onset of shortness of breath or severe calf pain please go to closest emergency room. 21. Your pain medicine is a Narcotic and may cause constipation. You may take an over the counter stool softener while taking pain medicine. 25.  You will get surveys either via text message or email after your surgery on a periodic basis. Please participate in the surveys as it helps to track your progress. ICE THERAPY WRAP:    Keep ice therapy wrap on when resting. DO not wear when moving or walking. Ice packs are reusable. Ice Therapy wrap holds two ice packs at a time. Things to watch for:             Increased swelling of the surgical site             Spreading of redness around the incision site             Drainage of pus from the incision site             Developing a fever of 101.5 °F or higher             If any of these symptoms occur you have any questions please contact our office at [phone]. If you need to talk to Dr. Leta Bustamante or his staff after hours please call the office and have the on-call service get in touch with the provider on call that day. Please note pain medications are not refilled after hours or on weekends.   If Dr. Leta Bustamante or his staff do not call you back within 30 minutes. Please tell the  to try again. Phone: [phone]  www. [employer]

## 2022-11-17 NOTE — ANESTHESIA PREPROCEDURE EVALUATION
Relevant Problems   No relevant active problems       Anesthetic History   No history of anesthetic complications            Review of Systems / Medical History  Patient summary reviewed, nursing notes reviewed and pertinent labs reviewed    Pulmonary  Within defined limits                 Neuro/Psych   Within defined limits           Cardiovascular    Hypertension          Hyperlipidemia    Exercise tolerance: >4 METS     GI/Hepatic/Renal  Within defined limits              Endo/Other        Obesity and arthritis     Other Findings   Comments: Pacer         Physical Exam    Airway  Mallampati: II  TM Distance: 4 - 6 cm  Neck ROM: normal range of motion   Mouth opening: Normal     Cardiovascular  Regular rate and rhythm,  S1 and S2 normal,  no murmur, click, rub, or gallop  Rhythm: regular  Rate: normal         Dental  No notable dental hx       Pulmonary  Breath sounds clear to auscultation               Abdominal  GI exam deferred       Other Findings            Anesthetic Plan    ASA: 2  Anesthesia type: general - backup, spinal and regional - saphenous block      Post-op pain plan if not by surgeon: peripheral nerve block single    Induction: Intravenous  Anesthetic plan and risks discussed with: Patient

## 2022-11-17 NOTE — OP NOTES
Operative Note    Patient: Misael Lindsay MRN: [id number]  Surgery Date: 11/17/2022  [unfilled]          Procedure  Primary Surgeon    LEFT TKA  Haven Diaz MD    * Panel 2 does not exist *  * Panel 2 does not exist *    * Panel 3 does not exist *  * Panel 3 does not exist *     Surgeon(s) and Role:     * Haven Diaz MD - Primary    Other OR Staff/Assistants:  Circ-1: Watson Espinoza RN  Circ-Relief: Ernie Soria  Scrub Tech-1: Ramírez Marie RN-2: Manisha Barnhart  Surg Asst-1: Jose Johnson Staff: Senia bowser Assistant Tasks:  Closing    Pre-operative Diagnosis:  Osteoarthritis of left knee, unspecified osteoarthritis type [M17.12]    Post-operative Diagnosis: same as preop diagnosis    Anesthesia Type: Spinal     Findings: djd    Complications: No    EBL: 50 cc    Body mass index is 31.64 kg/m². Specimens: None    Implants       No active implants to display in this view. Operative procedure: Total knee replacement    OPERATIVE PROCEDURE:  Please note the first assistant role was to help in patient positioning and draping of the extremity in a sterile fashion. Also during the surgery the assistant's responsibilities included but not limited to extremity positioning during critical portions of the surgery. Assisting in using and placement of retractors during surgery. Lower extremity was prepped and draped in a sterile fashion. After adequate anesthesia was given, the patient was placed in a well-padded supine position. Subvastus arthrotomy from the tibial tubercle to the superior pole of the patella was made. Knee was hyperflexed. Intramedullary reaming of distal femur and proximal tibia was performed. 10 mm of distal femur was cut. Anterior-posterior sizing guide was used. Anterior, posterior, chamfer cuts, and box cuts were made next. Proximal tibial cut and preparation performed.   Posterior osteophyte meniscal remnants were removed, and also patella was everted. Free-hand cut of the patella was made. Trial components were placed. The patient was found to have excellent range of motion and stability with all trial components. All the trial components removed. Copious irrigation performed. Distal femur, proximal tibia, and patella were impacted in place. Excessive cement was removed. After the cement was hard, Subvastus arthrotomy closed with Vicryl stitch. Compressive dressing was applied. The patient was taken to PACU in stable condition. Please note due to the patient's BMI of greater than 30 significant surgical effort was required compared to the standard patient with a BMI lower than 30. Surgical time increased approximately 30% from the normal surgical time due to the patient's high BMI. Because of the high BMI patient's knee would be considered a complex total knee replacement rather than a standard total knee replacement.       Rowan Collins MD

## 2022-11-17 NOTE — ANESTHESIA PROCEDURE NOTES
Peripheral Block    Start time: 11/17/2022 11:57 AM  End time: 11/17/2022 12:02 PM  Performed by: Greg Adams CRNA  Authorized by: Greg Adams CRNA       Pre-procedure: Indications: at surgeon's request and post-op pain management    Preanesthetic Checklist: patient identified, risks and benefits discussed, site marked, timeout performed, anesthesia consent given, patient being monitored and fire risk safety assessment completed and verbalized    Timeout Time: 11:57 EST Quincyshweta Perera)      Block Type:   Block Type:   Adductor canal block  Laterality:  Left  Monitoring:  Standard ASA monitoring, continuous pulse ox, heart rate, frequent vital sign checks, oxygen and responsive to questions  Injection Technique:  Single shot  Procedures: ultrasound guided    Patient Position: supine  Prep: chlorhexidine    Location:  Mid thigh  Needle Type:  Ultraplex  Needle Gauge:  20 G  Needle Localization:  Ultrasound guidance  Medication Injected:  Midazolam (VERSED) injection - IntraVENous   4 mg - 11/17/2022 12:00:00 PM  bupivacaine (PF) (MARCAINE) 0.5% injection - Peripheral Nerve Block   20 mL - 11/17/2022 12:00:00 PM  dexamethasone (DECADRON) 4 mg/mL injection - Peripheral Nerve Block   4 mg - 11/17/2022 12:00:00 PM  Med Admin Time: 11/17/2022 12:00 PM    Assessment:  Number of attempts:  1  Injection Assessment:  Incremental injection every 5 mL, no paresthesia, ultrasound image on chart, local visualized surrounding nerve on ultrasound, negative aspiration for blood and no intravascular symptoms  Patient tolerance:  Patient tolerated the procedure well with no immediate complications

## 2022-11-17 NOTE — INTERVAL H&P NOTE
Update History & Physical    The Patient's History and Physical was reviewed with the patient. The patient was examined. There was no change. The surgical site was confirmed by the patient and me. Patient understands and wants to proceed with the procedure. If applicable, I have discussed with the patient / power of  the rationale for blood component transfusion; its benefits in treating or preventing fatigue, organ damage, or death; and its risk which includes mild transfusion reactions, rare risk of blood borne infection, or more serious but rare reactions. I have discussed the alternatives to transfusion, including the risk and consequences of not receiving transfusion. The patient / Felicitas Putt of  had an opportunity to ask questions and had agreed to proceed with transfusion of blood components. Plan:  The risk, benefits, expected outcome, and alternative to the recommended procedure have been discussed with the patient.       Electronically signed by RICARDA Rodriguez on 11/17/2022 at 10:17 AM

## 2022-11-17 NOTE — ANESTHESIA POSTPROCEDURE EVALUATION
Procedure(s):  LEFT TKA. general - backup, spinal, regional    Anesthesia Post Evaluation      Multimodal analgesia: multimodal analgesia used between 6 hours prior to anesthesia start to PACU discharge  Patient location during evaluation: bedside  Patient participation: complete - patient participated  Level of consciousness: awake and alert  Pain score: 0  Pain management: adequate  Airway patency: patent  Anesthetic complications: no  Cardiovascular status: acceptable and stable  Respiratory status: acceptable and room air  Hydration status: acceptable  Comments: Ok to discharge when post op criteria met.    Post anesthesia nausea and vomiting:  none  Final Post Anesthesia Temperature Assessment:  Normothermia (36.0-37.5 degrees C)      INITIAL Post-op Vital signs:   Vitals Value Taken Time   /49 11/17/22 1359   Temp     Pulse 2 11/17/22 1359   Resp 14 11/17/22 1359   SpO2 60 % 11/17/22 1359

## 2022-11-18 ENCOUNTER — TELEPHONE (OUTPATIENT)
Dept: ORTHOPEDIC SURGERY | Age: 72
End: 2022-11-18

## 2022-11-18 ENCOUNTER — HOSPITAL ENCOUNTER (OUTPATIENT)
Dept: PHYSICAL THERAPY | Age: 72
Discharge: HOME | End: 2022-11-18
Payer: MEDICARE

## 2022-11-18 DIAGNOSIS — Z96.652 STATUS POST LEFT KNEE REPLACEMENT: Primary | ICD-10-CM

## 2022-11-18 PROCEDURE — 97161 PT EVAL LOW COMPLEX 20 MIN: CPT

## 2022-11-18 PROCEDURE — 97110 THERAPEUTIC EXERCISES: CPT

## 2022-11-18 PROCEDURE — 97140 MANUAL THERAPY 1/> REGIONS: CPT

## 2022-11-18 RX ORDER — HYDROMORPHONE HYDROCHLORIDE 4 MG/1
4 TABLET ORAL
Qty: 30 TABLET | Refills: 0 | Status: SHIPPED | OUTPATIENT
Start: 2022-11-18 | End: 2022-11-26

## 2022-11-18 NOTE — TELEPHONE ENCOUNTER
Left tka  11/17    Patient is in a lot pain, she is taking the pain meds with Tylenol and had no relief.  She stated that she was unable to sleep last night

## 2022-11-18 NOTE — TELEPHONE ENCOUNTER
Patient had TKA yesterday and states that the pain medication that was given her is not working. She called yesterday and was told to alternate Tylenol.   Could you please call patient and discuss what she needs to do

## 2022-11-18 NOTE — PROGRESS NOTES
[facility] at [address] [facility], [facility]  Phone: [phone]      Fax:  [phone]    Plan of Care/ Statement of Necessity for Physical Therapy Services  Patient name: Stephen Orozco Start of Care: 2022   Referral source: Bindu Mckinney MD : 1950    Medical Diagnosis: Pain in left knee [M25.562]  Payor: [address] [place] [address],9D / Plan: [address] / Product Type: Managed Care Medicare /  Onset Date:22    Treatment Diagnosis: Left Knee Pain   Prior Hospitalization: see medical history Provider#: [id number]   Medications: Verified on Patient summary List   Comorbidities: DM, OA,  Pacemaker, HTN, Hearing Impaired  Prior Level of Function: Frequent left knee pain         The Plan of Care and following information is based on the information from the initial evaluation. Assessment/ key information: Patient is a 81 y.o. female referred to PT with the above Dx. Patient seen today S/P Left TKR on 22. Significant pain in the left knee with limited ability for sit - stand and walking at this time. No sleep last nightnoted with limited tolerance to knee mobility       Patient presents to PT with an impaired gait, decreased balance, decreased strength, decreased flexibility, and decreased mobility of the left knee. Patient s/s appear to be consistent w/ diagnosis. Patient demonstrates the potential to make functional gains within a reasonable time frame. Patient will benefit from skilled PT to address impairments and improve functional mobility, strength, gait and balance for an improved quality of life.   Fall Risk Assessment: Patient demonstrates a low Fall Risk    Evaluation Complexity History HIGH Complexity :3+ comorbidities / personal factors will impact the outcome/ POC ; Examination MEDIUM Complexity : 3 Standardized tests and measures addressing body structure, function, activity limitation and / or participation in recreation  ;Presentation LOW Complexity : Stable, uncomplicated  ;Clinical Decision Making HIGH Complexity : FOTO score of 1- 25   Overall Complexity Rating: LOW   Problem List: pain affecting function, decrease ROM, decrease strength, edema affecting function, impaired gait/ balance, decrease ADL/ functional abilitiies, decrease activity tolerance, decrease flexibility/ joint mobility, decrease transfer abilities, and other FOTO = 21    Treatment Plan may include any combination of the following: Therapeutic exercise, Neuromuscular reeducation, Manual therapy, Therapeutic activity, Self care/home management, Electric stim unattended , Gait training, Ultrasound, Mechanical traction, Electric stim attended, Needle insertion w/o injection (1 or 2 muscles), and Needle insertion w/o injection (3+ muscles)  Patient / Family readiness to learn indicated by: asking questions, trying to perform skills, and interest  Persons(s) to be included in education: patient (P)  Barriers to Learning/Limitations: None  Patient Goal (s): Want to feel better and get back to normal living  Patient Self Reported Health Status: good  Rehabilitation Potential: good    Short Term Goals: To be accomplished in 5 treatments:  1. Pt will be compliant and independent with HEP in order to facilitate PT sessions and aid with self management             Eval Status:  Initiated             Current Status:  2. Pt to tolerate 30 min or more of TE and/or Interventions w/o increased s/s             Eval Status:  Initiated             Current Status:     Long Term Goals: To be accomplished in 10 treatments:  1. Pt will report 50% improvement or better with dysfunction to show a significant increase in ability to tolerate ADL             Eval Status:  Initiated             Current Status:  2.   Pt will have decreased pain at 3/10 or better for carry over to doing her usual household activities with less discomfort Eval Status:  Pain 9/10             Current Status:  3. Pt will demonstrate ambulation for 300' (I) with some discomfort for carryover to (I) ambulation between rooms in her home               Eval Status:  Amb Slow reciprocal gait, guarded with RW             Current Status:  4. Pt will demonstrate ARoM left knee Flx to 120* or better for carryover to performing sit - stand from a standard height seat with little to no difficulty               Eval Status:  Knee flx 55*             Current Status:  5. Pt will improve FOTO score to 45 in 17 visits to show significant improvement in function for progress to performing normal household chores and other daily activity with little to no difficulty             Eval Status: FOTO = 21             Current Status:   Frequency / Duration: Patient to be seen 3 times per week for 10 treatments. Patient/ Caregiver education and instruction: Diagnosis, prognosis, self care, activity modification, and exercises   [x]  Plan of care has been reviewed with PTA    Certification Period: 11/18/22 - 12/17/22  Samira Pacheco, PT 11/18/2022 11:19 AM  _____________________________________________________________________  I certify that the above Therapy Services are being furnished while the patient is under my care. I agree with the treatment plan and certify that this therapy is necessary.     [facility] Signature:____________Date:_________TIME:________     Noel Bennett MD  ** Signature, Date and Time must be completed for valid certification **    Please sign and return to [facility] at [address]  [place], [facility] [address]  Phone: [phone]      Fax:  [phone]

## 2022-11-18 NOTE — PROGRESS NOTES
PT DAILY TREATMENT NOTE/KNEE EVAL     Patient Name: Gladys Alonzo  Date:2022  : 1950  [x]  Patient  Verified  Payor: [employer] MEDICARE / Plan: [employer] [address] / Product Type: Managed Care Medicare /    In time:1120  Out time:1157  Total Treatment Time (min): 37  Visit #: 1 of 10    Medicare/BCBS Only   Total Timed Codes (min):  24 1:1 Treatment Time:  37       Treatment Area: Pain in left knee [M25.562]      SUBJECTIVE  Pain Level (0-10 scale): 9  [x]constant []intermittent []improving []worsening []no change since onset     Any medication changes, allergies to medications, adverse drug reactions, diagnosis change, or new procedure performed?: [x] No    [] Yes (see summary sheet for update)  Subjective functional status/changes:       Subjective: Patient is a 81 y.o. female referred to PT with the above Dx. Patient seen today S/P Left TKR on 22. Significant pain in the left knee with limited ability for sit - stand and walking at this time. No sleep last nightnoted with limited tolerance to knee mobility      Patient presents to PT with an impaired gait, decreased balance, decreased strength, decreased flexibility, and decreased mobility of the left knee. Patient s/s appear to be consistent w/ diagnosis. Patient demonstrates the potential to make functional gains within a reasonable time frame. Patient will benefit from skilled PT to address impairments and improve functional mobility, strength, gait and balance for an improved quality of life. Fall Risk Assessment: Patient demonstrates a low Fall Risk      Mechanism of Injury: TKR 22    Comorbidities: DM, OA,  Pacemaker, HTN, Hearing Impaired  Prior Level of Function: Frequent left knee pain    FOTO:  in 17 visits    Goal: Want to feel better and get back to normal living    Health Status: Good    What type of work do you do?   N/A    Living Situation/Domestic Life: Lives at home alone.  Daughter with her until Monday  Mobility: Mod (I)  Self Care Mod (I)  Stairs in the home? 5 into the house. Step to gait pattern    What type of daily activities/hobbies? Walk dog, walk 4-5 miles daily, gardening,     Limitations to Activity/Recreation/PLOF: Unable to perform SLR, Pain with walking, hard to get out of a normal seat, pain with bending knee         Barriers: [x]pain []financial []time []transportation []other    Motivation: High    FABQ Score: []low []elevate    Cognition: A & O x 3    Other:    Risk For Falls:   []No  [x]low []elevate           OBJECTIVE/EXAMINATION  Demonstrated Balance: Good with RW        Demonstrated Mobility: Mod (I) with RW  Gait: Slow reciprocal with use of RW         AROM PROM Strength Pain? ?    Right Left Right Left Right Left    Knee Flx  55  89      Knee Ext  0        SLR  UA                                          Modality rationale: decrease inflammation and decrease pain to improve the patients ability to tolerate post treatment soreness   Min Type Additional Details      [] Estim:  []Unatt       []IFC  []Premod                        []Other:  []w/ice   []w/heat  Position:  Location:      [] Estim: []Att    []TENS instruct  []NMES                    []Other:  []w/US   []w/ice   []w/heat  Position:  Location:      []  Traction: [] Cervical       []Lumbar                       [] Prone          []Supine                       []Intermittent   []Continuous Lbs:  [] before manual  [] after manual      []  Ultrasound: []Continuous   [] Pulsed                           []1MHz   []3MHz W/cm2:  Location:      []  Iontophoresis with dexamethasone         Location: [] Take home patch   [] In clinic     10 [x]  Ice     []  heat  []  Ice massage  []  Laser   []  Anodyne Position: supine  Location:Left Knee      []  Laser with stim  []  Other:  Position:  Location:      []  Vasopneumatic Device Pressure:       [] lo [] med [] hi   Temperature: [] lo [] med [] hi    [x] Skin assessment post-treatment:  [x]intact []redness- no adverse reaction    []redness - adverse reaction:      13 min [x]Eval                  []Re-Eval         14 min Therapeutic Exercise:  [x] See flow sheet : Develop and instruct HEP   Rationale: increase ROM, increase strength, and improve coordination to improve the patients ability to Initiate HEP and improve daily function     10 min Manual Therapy:  PROM left knee flx/ext with minor overstretch   The manual therapy interventions were performed at a separate and distinct time from the therapeutic activities interventions. Rationale: decrease pain, increase ROM, and increase tissue extensibility to improved left knee mobility tolerance                                                                 With   [x] TE   [] TA   [] neuro   [] other: Patient Education: [x] Review HEP    [] Progressed/Changed HEP based on:   [] positioning   [] body mechanics   [] transfers   [] heat/ice application    [] other:       Other Objective/Functional Measures:      Access Code: [email or web address]  URL: [email or web address]  Date: 11/18/2022  Prepared by: Eladia Cast  Long Sitting Quad Set - 2 x daily - 7 x weekly - 1 sets - 10 reps - 5 hold  Supine Knee Extension Strengthening - 2 x daily - 7 x weekly - 3 sets - 10 reps  Supine Straight Leg Raises - 2 x daily - 7 x weekly - 3 sets - 10 reps  Seated March - 2 x daily - 7 x weekly - 3 sets - 10 reps  Sit to Stand - 2 x daily - 7 x weekly - 3 sets - 10 reps  Mini Squat with Counter Support - 2 x daily - 7 x weekly - 3 sets - 10 reps  Standing Knee Flexion - 2 x daily - 7 x weekly - 3 sets - 10 reps  Supine Heel Slide - 2 x daily - 7 x weekly - 3 sets - 10 reps - 5 hold  Seated Heel Slide - 2 x daily - 7 x weekly - 3 sets - 10 reps - 5 hold    Pain Level (0-10 scale) post treatment: 7-8     ASSESSMENT/Changes in Function:    - Pt demo understanding of HEP        [x]  See Plan of Care  []  See progress note/recertification  []  See Discharge Summary         Progress towards goals / Updated goals:  Short Term Goals: To be accomplished in 5 treatments:  1. Pt will be compliant and independent with HEP in order to facilitate PT sessions and aid with self management   Eval Status:  Initiated   Current Status:  2. Pt to tolerate 30 min or more of TE and/or Interventions w/o increased s/s   Eval Status:  Initiated   Current Status:    Long Term Goals: To be accomplished in 10 treatments:  1. Pt will report 50% improvement or better with dysfunction to show a significant increase in ability to tolerate ADL   Eval Status:  Initiated   Current Status:  2. Pt will have decreased pain at 3/10 or better for carry over to doing her usual household activities with less discomfort     Eval Status:  Pain 9/10   Current Status:  3. Pt will demonstrate ambulation for 300' (I) with some discomfort for carryover to (I) ambulation between rooms in her home     Eval Status:  Amb Slow reciprocal gait, guarded with RW   Current Status:  4. Pt will demonstrate ARoM left knee Flx to 120* or better for carryover to performing sit - stand from a standard height seat with little to no difficulty     Eval Status:  Knee flx 55*   Current Status:  5.   Pt will improve FOTO score to 45 in 17 visits to show significant improvement in function for progress to performing normal household chores and other daily activity with little to no difficulty   Eval Status: FOTO = 21   Current Status:      PLAN  [x]  Upgrade activities as tolerated     [x]  Continue plan of care  []  Update interventions per flow sheet       []  Discharge due to:_  []  Other:_        Evelin Harris, PT 11/18/2022  11:20 AM

## 2022-11-21 ENCOUNTER — APPOINTMENT (OUTPATIENT)
Dept: PHYSICAL THERAPY | Age: 72
End: 2022-11-21
Payer: MEDICARE

## 2022-11-21 DIAGNOSIS — Z96.652 STATUS POST TOTAL LEFT KNEE REPLACEMENT: Primary | ICD-10-CM

## 2022-11-22 ENCOUNTER — HOSPITAL ENCOUNTER (OUTPATIENT)
Dept: PHYSICAL THERAPY | Age: 72
Discharge: HOME | End: 2022-11-22
Payer: MEDICARE

## 2022-11-22 PROCEDURE — 97530 THERAPEUTIC ACTIVITIES: CPT

## 2022-11-22 PROCEDURE — 97140 MANUAL THERAPY 1/> REGIONS: CPT

## 2022-11-22 PROCEDURE — 97110 THERAPEUTIC EXERCISES: CPT

## 2022-11-22 NOTE — PROGRESS NOTES
PT DAILY TREATMENT NOTE     Patient Name: Keisha Lundberg  Date:2022  : 1950  [x]  Patient  Verified  Payor: [place] [employer]ICARE / Plan: [address] / Product Type: Managed Care Medicare /    In time:420  Out time:500  Total Treatment Time (min): 40  Visit #: 2 of 10    Medicare/BCBS Only   Total Timed Codes (min):  40 1:1 Treatment Time:  40       Treatment Area: Pain in left knee [M25.562]    SUBJECTIVE  Pain Level (0-10 scale): 4-5/10  Any medication changes, allergies to medications, adverse drug reactions, diagnosis change, or new procedure performed?: [x] No    [] Yes (see summary sheet for update)  Subjective functional status/changes:   [] No changes reported  Reports pain in the upper back     OBJECTIVE      17 min Therapeutic Exercise:  [] See flow sheet :   Rationale: increase ROM and increase strength to improve the patients ability to perform ADLs    8 min Therapeutic Activity:  []  See flow sheet :   Rationale: increase ROM, increase strength, and improve coordination  to improve the patients ability to perform ADLs       15 min Manual Therapy:  STM/DTM/TPR right UT/levator    The manual therapy interventions were performed at a separate and distinct time from the therapeutic activities interventions.   Rationale: decrease pain, increase ROM, increase tissue extensibility, and decrease trigger points to perform ADLs            With   [] TE   [] TA   [] neuro   [] other: Patient Education: [x] Review HEP    [] Progressed/Changed HEP based on:   [] positioning   [] body mechanics   [] transfers   [] heat/ice application    [] other:      Other Objective/Functional Measures:   Initiate TE per flow sheet      Pain Level (0-10 scale) post treatment: 2/10    ASSESSMENT/Changes in Function: Patient actively participates in therapy and tolerates progressions well with no c/o increased pain       Patient will continue to benefit from skilled PT services to modify and progress therapeutic interventions, address functional mobility deficits, address ROM deficits, address strength deficits, analyze and address soft tissue restrictions, analyze and cue movement patterns, and analyze and modify body mechanics/ergonomics to attain remaining goals. []  See Plan of Care  []  See progress note/recertification  []  See Discharge Summary         Progress towards goals / Updated goals:  Short Term Goals: To be accomplished in 5 treatments:  1. Pt will be compliant and independent with HEP in order to facilitate PT sessions and aid with self management             Eval Status:  Initiated             Current Status: Met reports compliance with HEP 11/22/22  2. Pt to tolerate 30 min or more of TE and/or Interventions w/o increased s/s             Eval Status:  Initiated             Current Status:     Long Term Goals: To be accomplished in 10 treatments:  1. Pt will report 50% improvement or better with dysfunction to show a significant increase in ability to tolerate ADL             Eval Status:  Initiated             Current Status:  2. Pt will have decreased pain at 3/10 or better for carry over to doing her usual household activities with less discomfort               Eval Status:  Pain 9/10             Current Status:  3. Pt will demonstrate ambulation for 300' (I) with some discomfort for carryover to (I) ambulation between rooms in her home               Eval Status:  Amb Slow reciprocal gait, guarded with RW             Current Status:  4. Pt will demonstrate ARoM left knee Flx to 120* or better for carryover to performing sit - stand from a standard height seat with little to no difficulty               Eval Status:  Knee flx 55*             Current Status:  5.   Pt will improve FOTO score to 45 in 17 visits to show significant improvement in function for progress to performing normal household chores and other daily activity with little to no difficulty             Eval Status: FOTO = 21             Current Status:     PLAN  [x]  Upgrade activities as tolerated     [x]  Continue plan of care  []  Update interventions per flow sheet       []  Discharge due to:_  []  Other:_      Anne Kodi, MICHELINE 11/22/2022  5:01 PM    Future Appointments   Date Time Provider Hazel Vicente   11/28/2022  1:15 PM Sherrod Kocher, ARNP [facility]   11/29/2022 11:45 AM Starr Nieto, PT [facility]   12/1/2022 12:30 PM Starr Nieto, PT [facility]   12/5/2022 11:00 AM Sharmin Butler, IMCHELINE [facility]   12/7/2022 11:45 AM Sharmin Butler, PTA [facility]   12/9/2022 11:45 AM Sharmin Butler, PTA [facility]   12/12/2022 11:00 AM Sharmin Butler, PTA [facility]   12/14/2022 11:45 AM Starr Nieto, PT [facility]   12/16/2022 11:45 AM Starr Nieto, PT [facility]

## 2022-11-22 NOTE — PROGRESS NOTES
PT DAILY TREATMENT NOTE     Patient Name: Almita Calderon  Date:2022  : 1950  [x]  Patient  Verified  Payor: [facility] MEDICARE / Plan: [address] / Product Type: Managed Care Medicare /    In time:505  Out time:550  Total Treatment Time (min): 45  Visit #: 2 of 10    Treatment Area: Pain in left knee [M25.562]    SUBJECTIVE  Pain Level (0-10 scale): 5/10  Any medication changes, allergies to medications, adverse drug reactions, diagnosis change, or new procedure performed?: [x] No    [] Yes (see summary sheet for update)  Subjective functional status/changes:   [] No changes reported  Reports decreased pain since eval     OBJECTIVE    Modality rationale: decrease edema, decrease inflammation, and decrease pain to improve the patients ability to perform ADLs   Min Type Additional Details    [] Estim:  []Unatt       []IFC  []Premod                        []Other:  []w/ice   []w/heat  Position:  Location:    [] Estim: []Att    []TENS instruct  []NMES                    []Other:  []w/US   []w/ice   []w/heat  Position:  Location:    []  Traction: [] Cervical       []Lumbar                       [] Prone          []Supine                       []Intermittent   []Continuous Lbs:  [] before manual  [] after manual    []  Ultrasound: []Continuous   [] Pulsed                           []1MHz   []3MHz W/cm2:  Location:    []  Iontophoresis with dexamethasone         Location: [] Take home patch   [] In clinic   10 [x]  Ice     []  heat  []  Ice massage  []  Laser   []  Anodyne Position: Long sitting with LE elevated   Location:left knee     []  Laser with stim  []  Other:  Position:  Location:    []  Vasopneumatic Device    []  Right     []  Left  Pre-treatment girth:  Post-treatment girth:  Measured at (location):  Pressure:       [] lo [] med [] hi   Temperature: [] lo [] med [] hi   [x] Skin assessment post-treatment:  [x]intact []redness- no adverse reaction []redness - adverse reaction:     12 min Therapeutic Exercise:  [x] See flow sheet :   Rationale: increase ROM and increase strength to improve the patients ability to perform ADLs    8 min Therapeutic Activity:  [x]  See flow sheet :   Rationale: increase ROM, increase strength, and improve coordination  to improve the patients ability to perform ADLs       15 min Manual Therapy:  PROM with slight OP within pain range knee flexion    The manual therapy interventions were performed at a separate and distinct time from the therapeutic activities interventions. Rationale: decrease pain, increase ROM, and increase tissue extensibility to perform ADLs          With   [x] TE   [] TA   [] neuro   [] other: Patient Education: [x] Review HEP    [] Progressed/Changed HEP based on:   [] positioning   [] body mechanics   [] transfers   [] heat/ice application    [] other:      Other Objective/Functional Measures:   Initiate TE per flow sheet  AROM right knee flexion 70* pre MT, 89* post     Pain Level (0-10 scale) post treatment: 5/10    ASSESSMENT/Changes in Function: Patient actively participates in therapy. Difficulty with AROM, but capable of greater ROM with PROM/AAROM. Patient tolerated 3' on the bike today with rocking. Reports agitation with stocking on the leg, advised patient to contact MD about the stocking to determine next steps. Patient requires occasional cues for encouragement throughout to perform. Patient will continue to benefit from skilled PT services to modify and progress therapeutic interventions, address functional mobility deficits, address ROM deficits, address strength deficits, analyze and address soft tissue restrictions, and analyze and cue movement patterns to attain remaining goals. []  See Plan of Care  []  See progress note/recertification  []  See Discharge Summary         Progress towards goals / Updated goals:  Short Term Goals: To be accomplished in 5 treatments:  1.   Pt will be compliant and independent with HEP in order to facilitate PT sessions and aid with self management             Eval Status:  Initiated             Current Status: Reports partial compliance, not doing all the exercises 11/22/22  2. Pt to tolerate 30 min or more of TE and/or Interventions w/o increased s/s             Eval Status:  Initiated             Current Status:     Long Term Goals: To be accomplished in 10 treatments:  1. Pt will report 50% improvement or better with dysfunction to show a significant increase in ability to tolerate ADL             Eval Status:  Initiated             Current Status:  2. Pt will have decreased pain at 3/10 or better for carry over to doing her usual household activities with less discomfort               Eval Status:  Pain 9/10             Current Status: Progressing, reports 5/10 11/22/22  3. Pt will demonstrate ambulation for 300' (I) with some discomfort for carryover to (I) ambulation between rooms in her home               Eval Status:  Amb Slow reciprocal gait, guarded with RW             Current Status:  4. Pt will demonstrate ARoM left knee Flx to 120* or better for carryover to performing sit - stand from a standard height seat with little to no difficulty               Eval Status:  Knee flx 55*             Current Status: Knee flx 89* after MT 11/22/22   5.   Pt will improve FOTO score to 45 in 17 visits to show significant improvement in function for progress to performing normal household chores and other daily activity with little to no difficulty             Eval Status: FOTO = 21             Current Status:     PLAN  [x]  Upgrade activities as tolerated     [x]  Continue plan of care  []  Update interventions per flow sheet       []  Discharge due to:_  []  Other:_      Bradnie Kim PTA 11/22/2022  5:15 PM    Future Appointments   Date Time Provider Hazel Vicente   11/28/2022  1:15 PM RICARDA Worley BS AMB   11/29/2022 11:45 AM Daiana Worthington Wilbur Kawasaki, PT [facility]   12/1/2022 12:30 PM Azalea Woods, PT [facility]   12/5/2022 11:00 AM Karole Eduar, PTA [facility]   12/7/2022 11:45 AM Karole Eduar, PTA [facility]   12/9/2022 11:45 AM Karole Eduar, PTA [facility]   12/12/2022 11:00 AM Karole Eduar, PTA [facility]   12/14/2022 11:45 AM Azalea Woods, PT [facility]   12/16/2022 11:45 AM Azalea Woods, PT [facility]

## 2022-11-23 ENCOUNTER — APPOINTMENT (OUTPATIENT)
Dept: PHYSICAL THERAPY | Age: 72
End: 2022-11-23
Payer: MEDICARE

## 2022-11-26 ENCOUNTER — HOME HEALTH ADMISSION (OUTPATIENT)
Dept: HOME HEALTH SERVICES | Facility: HOME HEALTH | Age: 72
End: 2022-11-26

## 2022-11-27 ENCOUNTER — HOME CARE VISIT (OUTPATIENT)
Dept: HOME HEALTH SERVICES | Facility: HOME HEALTH | Age: 72
End: 2022-11-27

## 2022-11-28 ENCOUNTER — VIRTUAL VISIT (OUTPATIENT)
Dept: ORTHOPEDIC SURGERY | Age: 72
End: 2022-11-28
Payer: MEDICARE

## 2022-11-28 DIAGNOSIS — Z96.652 STATUS POST LEFT KNEE REPLACEMENT: Primary | ICD-10-CM

## 2022-11-28 PROCEDURE — 99024 POSTOP FOLLOW-UP VISIT: CPT | Performed by: NURSE PRACTITIONER

## 2022-11-28 RX ORDER — SERTRALINE HYDROCHLORIDE 50 MG/1
TABLET, FILM COATED ORAL
COMMUNITY

## 2022-11-28 RX ORDER — LOSARTAN POTASSIUM 100 MG/1
TABLET ORAL
COMMUNITY

## 2022-11-28 RX ORDER — OXYCODONE AND ACETAMINOPHEN 5; 325 MG/1; MG/1
1 TABLET ORAL
Qty: 30 TABLET | Refills: 0 | Status: SHIPPED | OUTPATIENT
Start: 2022-11-28 | End: 2022-12-06

## 2022-11-28 RX ORDER — GABAPENTIN 300 MG/1
300 CAPSULE ORAL
Qty: 30 CAPSULE | Refills: 1 | Status: SHIPPED | OUTPATIENT
Start: 2022-11-28

## 2022-11-28 RX ORDER — SIMVASTATIN 20 MG/1
TABLET, FILM COATED ORAL
COMMUNITY

## 2022-11-28 NOTE — LETTER
11/28/2022 1:28 PM    Ms. [address] [place] [id number]      Jiffy Knee Replacement: What to Expect at [address] [place] [address] Drive had a Jiffy Knee replacement. The doctor replaced the worn ends of the bones that connect to your knee (thighbone and lower leg bone) with plastic and metal parts. Your knee will continue to improve for up to a year. You will need to do weeks/months of physical rehabilitation (rehab) after a knee replacement. Rehab will help you strengthen the muscles of the knee and help you regain movement. After you recover, your artificial knee will allow you to do normal daily activities with less pain or no pain at all. You may be able to hike, dance, or ride a bike. Talk to your doctor about whether you can do more strenuous activities. Always tell your caregivers that you have an artificial knee. How long it will take to walk on your own, return to normal activities, and go back to work depends on your health and how well your rehabilitation (rehab) program goes. The better you do with your rehab exercises, the quicker you will get your strength and movement back. This care sheet gives you a general idea about how long it will take for you to recover. But each person recovers at a different pace. Follow the steps below to get better as quickly as possible. How can you care for yourself at home? Activity    You will be participating in an outpatient physical therapy program. Your goal is progress from a walker to a cane to nothing at all while walking. You should be doing your home exercises 3-4 times daily. The therapist will determine when you are ready to stop the physical therapy program.     If you require a work note, please call our office when you are ready to go back to work. You may drive when you are no longer using a walker or daytime narcotic pain medications.      Some clicking or clunking in the knee is normal.     If you have any traumas or falls, contact our office immediately. Some bruising in the leg and foot is normal.     If you experience any significant calf pain or swelling or shortness of breath, please call Dr. Liane Hill or go to the ER if it is urgent. Diet    Drink plenty of fluids (unless your doctor tells you not to). You may notice that your bowel movements are not regular right after your surgery. This is common. Try to avoid constipation and straining with bowel movements. Drinking enough fluids, taking a stool softener, and eating foods that are good sources of fiber can help you avoid constipation. If you have not had a bowel movement after a couple of days, talk to your doctor. Medicines    You should take aspirin 325 mg twice daily until you are 1 month out from surgery. If you take a prescription blood thinner, continue that as directed. If you think your pain medicine is making you sick to your stomach:  ? Take your medicine after meals (unless your doctor has told you not to). ? Take the prescribed nausea pills as directed. ? Ask your doctor for a different pain medicine. If your doctor prescribed antibiotics, take them as directed. If you require a refill on narcotic pain medication, please let us know at the time of today's appointment or give at least 2 business days for refill for future dates. Incision care    You can shower and get your incision wet with soap and water. Pat it dry and no further dressing changes will be required. You will see a clear surgical mesh tape on your knee. You may remove that tape two weeks after the surgery. If you notice any redness around the incision site or fluid from the knee incision, call Dr. Jose Harris office immediately. Ice    For pain and swelling, put ice or a cold pack on the area for 10 to 20 minutes at a time. Put a thin cloth between the ice and your skin.  If your doctor recommended cold therapy using a portable machine, follow the instructions that came with the machine. Other instructions    Wear compression stockings if your doctor told you to. These may help to prevent blood clots. Your doctor will tell you how long you need to keep wearing the compression stockings. If you have any procedures or dental work where there may be bleeding, it is recommended that you get antibiotics to take 1 hour prior to that procedure. Please call our office at least 2 business ahead of the procedure. Follow-up care is a key part of your treatment and safety. Be sure to make and go to all appointments, and call your doctor if you are having problems. It's also a good idea to know your test results and keep a list of the medicines you take. When should you call for help? Call 911 anytime you think you may need emergency care. For example, call if:    · You passed out (lost consciousness). · You have severe trouble breathing. · You have sudden chest pain and shortness of breath, or you cough up blood. Call your doctor now or seek immediate medical care if:    1. You have signs of infection, such as:  ? Increased pain, swelling, warmth, or redness. ? Red streaks leading from the incision. ? Pus draining from the incision. ? A fever. 2. You have signs of a blood clot, such as:  ? Pain in your calf, back of the knee, thigh, or groin. ? Redness and swelling in your leg or groin. · Your incision comes open and begins to bleed, or the bleeding increases. · You have pain that does not get better after you take pain medicine. Watch closely for changes in your health, and be sure to contact your doctor if:    · You do not have a bowel movement after taking a laxative. Where can you learn more? Go to http://www.gray.com/  Enter T054 in the search box to learn more about \"Total Knee Replacement: What to Expect at Home. \"  Current as of: July 1, 2021               Content Version: 13.2  © 8620-9510 [employer]. Care instructions adapted under license by [employer] (which disclaims liability or warranty for this information). If you have questions about a medical condition or this instruction, always ask your healthcare professional. [address]  any warranty or liability for your use of this information.             Sincerely,      RICARDA Thomas

## 2022-11-28 NOTE — PROGRESS NOTES
Subjective:      Patient presents for postop care following left TKA. Surgery was on 11/17/2022. Ambulating independently. Pain is not well controlled with current analgesics. Medication(s) being used: Percocet. Complains of nighttime \"burning\" pain. States her postop pain is worse than preop pain \"times 100. \"    Objective: There were no vitals taken for this visit. General:  alert, cooperative, no distress, appears stated age   ROM: 0/95; good quad strength on extension   Incision:   healing well, no drainage, no erythema, incision well approximated, mild swelling     Assessment:     Doing well postoperatively. Pain main complaint. Plan:     1. Continue PT. 2. Wound care/showering discussed. 3. Continue DVT prophylaxis as directed; will add gabapentin for bedtime prn.  4. Follow up in 3 weeks to reassess progress. Cyndee Phillips, who was evaluated through a synchronous (real-time) audio-video encounter, and/or her healthcare decision maker, is aware that it is a billable service, with coverage as determined by her insurance carrier. She provided verbal consent to proceed: Yes, and patient identification was verified. It was conducted pursuant to the emergency declaration under the [facility], [address] [place] [address] authority and the [employer] and [employer] Act. A caregiver was present when appropriate. Ability to conduct physical exam was limited. I was in the office. The patient was at home.

## 2022-11-29 ENCOUNTER — APPOINTMENT (OUTPATIENT)
Dept: PHYSICAL THERAPY | Age: 72
End: 2022-11-29
Payer: MEDICARE

## 2022-12-01 ENCOUNTER — APPOINTMENT (OUTPATIENT)
Dept: PHYSICAL THERAPY | Age: 72
End: 2022-12-01
Payer: MEDICARE

## 2022-12-05 ENCOUNTER — HOSPITAL ENCOUNTER (OUTPATIENT)
Dept: PHYSICAL THERAPY | Age: 72
Discharge: HOME | End: 2022-12-05
Payer: MEDICARE

## 2022-12-05 ENCOUNTER — APPOINTMENT (OUTPATIENT)
Dept: PHYSICAL THERAPY | Age: 72
End: 2022-12-05
Payer: MEDICARE

## 2022-12-05 PROCEDURE — 97530 THERAPEUTIC ACTIVITIES: CPT

## 2022-12-05 PROCEDURE — 97110 THERAPEUTIC EXERCISES: CPT

## 2022-12-05 PROCEDURE — 97140 MANUAL THERAPY 1/> REGIONS: CPT

## 2022-12-05 NOTE — PROGRESS NOTES
PT DAILY TREATMENT NOTE     Patient Name: Colin Dougherty  Date:2022  : 1950  [x]  Patient  Verified  Payor: [facility] MEDICARE / Plan: [address] / Product Type: Managed Care Medicare /    In time:1100  Out time:1155  Total Treatment Time (min): 54  Visit #: 3 of 10    Treatment Area: Pain in left knee [M25.562]    SUBJECTIVE  Pain Level (0-10 scale): 7/10  Any medication changes, allergies to medications, adverse drug reactions, diagnosis change, or new procedure performed?: [x] No    [] Yes (see summary sheet for update)  Subjective functional status/changes:   [] No changes reported  Patient reports \"it feels like their is cement in my knee\"   Difficulty sleeping at night d/t increased pain     OBJECTIVE           Modality rationale: decrease edema, decrease inflammation, and decrease pain to improve the patients ability to perform ADLs    Min Type Additional Details     [] Estim:  []Unatt       []IFC  []Premod                        []Other:  []w/ice   []w/heat  Position:  Location:     [] Estim: []Att    []TENS instruct  []NMES                    []Other:  []w/US   []w/ice   []w/heat  Position:  Location:     []  Traction: [] Cervical       []Lumbar                       [] Prone          []Supine                       []Intermittent   []Continuous Lbs:  [] before manual  [] after manual     []  Ultrasound: []Continuous   [] Pulsed                           []1MHz   []3MHz W/cm2:  Location:     []  Iontophoresis with dexamethasone         Location: [] Take home patch   [] In clinic   8 [x]  Ice     []  heat  []  Ice massage  []  Laser   []  Anodyne Position: Long sitting with LE elevated   Location:left knee      []  Laser with stim  []  Other:  Position:  Location:     []  Vasopneumatic Device    []  Right     []  Left  Pre-treatment girth:  Post-treatment girth:  Measured at (location):  Pressure:       [] lo [] med [] hi   Temperature: [] lo [] med [] hi   [x] Skin assessment post-treatment:  [x]intact []redness- no adverse reaction    []redness - adverse reaction:     23 min Therapeutic Exercise:  [x] See flow sheet :   Rationale: increase ROM and increase strength to improve the patients ability to perform ADLs    9 min Therapeutic Activity:  [x]  See flow sheet :   Rationale: increase ROM, increase strength, and improve coordination  to improve the patients ability to perform ADLs       15 min Manual Therapy:  [place] left ankle/calf/knee/thigh    The manual therapy interventions were performed at a separate and distinct time from the therapeutic activities interventions. Rationale: decrease pain, increase ROM, increase tissue extensibility, and decrease edema  to perform ADLs            With   [] TE   [] TA   [] neuro   [] other: Patient Education: [x] Review HEP    [] Progressed/Changed HEP based on:   [] positioning   [] body mechanics   [] transfers   [] heat/ice application    [] other:      Other Objective/Functional Measures:   Cues for weight shift into the left LE during mini squats   Initiated sit-stands, mini squats, step ups with 4\" box      Pain Level (0-10 scale) post treatment: 5/10    ASSESSMENT/Changes in Function: Patient reports difficulty with pain, with interrupted sleep at night. Reports doing HEP and icing with elevation at home. Noted swelling at the ankle/calf as well as the medial aspect of the incision. Patient will continue to benefit from skilled PT services to modify and progress therapeutic interventions, address functional mobility deficits, address ROM deficits, address strength deficits, analyze and address soft tissue restrictions, analyze and cue movement patterns, and analyze and modify body mechanics/ergonomics to attain remaining goals. []  See Plan of Care  []  See progress note/recertification  []  See Discharge Summary         Progress towards goals / Updated goals:  Short Term Goals:  To be accomplished in 5 treatments:  1. Pt will be compliant and independent with HEP in order to facilitate PT sessions and aid with self management             Eval Status:  Initiated             Current Status: Met, reports compliance with HEP 12/5/22  2. Pt to tolerate 30 min or more of TE and/or Interventions w/o increased s/s             Eval Status:  Initiated             Current Status:     Long Term Goals: To be accomplished in 10 treatments:  1. Pt will report 50% improvement or better with dysfunction to show a significant increase in ability to tolerate ADL             Eval Status:  Initiated             Current Status:  2. Pt will have decreased pain at 3/10 or better for carry over to doing her usual household activities with less discomfort               Eval Status:  Pain 9/10             Current Status: Reports 7/10 pain today 12/5/22   3. Pt will demonstrate ambulation for 300' (I) with some discomfort for carryover to (I) ambulation between rooms in her home               Eval Status:  Amb Slow reciprocal gait, guarded with RW             Current Status: Patient amb without AD with antalgic gait 12/5/22  4. Pt will demonstrate ARoM left knee Flx to 120* or better for carryover to performing sit - stand from a standard height seat with little to no difficulty               Eval Status:  Knee flx 55*             Current Status: Knee flx 89* after MT 11/22/22   5.   Pt will improve FOTO score to 45 in 17 visits to show significant improvement in function for progress to performing normal household chores and other daily activity with little to no difficulty             Eval Status: FOTO = 21             Current Status:     PLAN[x]  [x]  Upgrade activities as tolerated       Continue plan of care  []  Update interventions per flow sheet       []  Discharge due to:_  []  Other:_      Shad Gaspar PTA 12/5/2022  11:26 AM    Future Appointments   Date Time Provider Hazel Vicente   12/7/2022 10:15 AM Bertha King, PT [facility]   12/9/2022  9:30 AM [facility]   12/12/2022 11:00 AM Sampson Polk PTA [facility]   12/14/2022 11:45 AM Bertha King, PT [facility]   12/16/2022 11:45 AM Bertha King PT [facility]   12/19/2022  9:00 AM RICARDA Connell AMB

## 2022-12-07 ENCOUNTER — APPOINTMENT (OUTPATIENT)
Dept: PHYSICAL THERAPY | Age: 72
End: 2022-12-07
Payer: MEDICARE

## 2022-12-07 ENCOUNTER — HOSPITAL ENCOUNTER (OUTPATIENT)
Dept: PHYSICAL THERAPY | Age: 72
Discharge: HOME | End: 2022-12-07
Payer: MEDICARE

## 2022-12-07 PROCEDURE — 97530 THERAPEUTIC ACTIVITIES: CPT

## 2022-12-07 PROCEDURE — 97140 MANUAL THERAPY 1/> REGIONS: CPT

## 2022-12-07 PROCEDURE — 97110 THERAPEUTIC EXERCISES: CPT

## 2022-12-07 NOTE — PROGRESS NOTES
PT DAILY TREATMENT NOTE     Patient Name: Penny Sosa  Date:2022  : 1950  [x]  Patient  Verified  Payor: [facility] MEDICARE / Plan: [address] / Product Type: Managed Care Medicare /    In time:1021  Out time:1101  Total Treatment Time (min): 40  Visit #: 4 of 10    Medicare/BCBS Only   Total Timed Codes (min):  40 1:1 Treatment Time:  40       Treatment Area: Pain in left knee [M25.562]    SUBJECTIVE  Pain Level (0-10 scale): 6-7/10  Any medication changes, allergies to medications, adverse drug reactions, diagnosis change, or new procedure performed?: [x] No    [] Yes (see summary sheet for update)  Subjective functional status/changes:   [] No changes reported  Noted improved sleep last night.  Patient only taking pain meds at night     OBJECTIVE              Modality rationale: Patient Declined modalities today     Min Type Additional Details      [] Estim:  []Unatt       []IFC  []Premod                        []Other:  []w/ice   []w/heat  Position:  Location:      [] Estim: []Att    []TENS instruct  []NMES                    []Other:  []w/US   []w/ice   []w/heat  Position:  Location:      []  Traction: [] Cervical       []Lumbar                       [] Prone          []Supine                       []Intermittent   []Continuous Lbs:  [] before manual  [] after manual      []  Ultrasound: []Continuous   [] Pulsed                           []1MHz   []3MHz W/cm2:  Location:      []  Iontophoresis with dexamethasone         Location: [] Take home patch   [] In clinic    PD []  Ice     []  heat  []  Ice massage  []  Laser   []  Anodyne       []  Laser with stim  []  Other:  Position:  Location:      []  Vasopneumatic Device    []  Right     []  Left  Pre-treatment girth:  Post-treatment girth:  Measured at (location):  Pressure:       [] lo [] med [] hi   Temperature: [] lo [] med [] hi    [] Skin assessment post-treatment:  []intact []redness- no adverse reaction    []redness - adverse reaction:       10 min Therapeutic Exercise:  [x] See flow sheet :   Rationale: increase ROM and increase strength to improve the patients ability to perform ADLs    15 min Therapeutic Activity:  [x]  See flow sheet :   Rationale: increase ROM, increase strength, and improve coordination  to improve the patients ability to perform ADLs      15 min Manual Therapy:  Effleurage left ankle/calf/knee/thigh, PROM knee flx with OP within pain range    The manual therapy interventions were performed at a separate and distinct time from the therapeutic activities interventions. Rationale: decrease pain, increase ROM, increase tissue extensibility, and decrease edema  to perform ADls       With   [x] TE   [] TA   [] neuro   [] other: Patient Education: [x] Review HEP    [] Progressed/Changed HEP based on:   [] positioning   [] body mechanics   [] transfers   [] heat/ice application    [] other:      Other Objective/Functional Measures:   Knee flexion after MT 89*    Antalgic gait d/t limited knee flexion during amb     Pain Level (0-10 scale) post treatment: 5-6/10    ASSESSMENT/Changes in Function: patient actively participates in therapy and tolerates treatment fairly. Requires occasional cues for encouragement to perform, patient hesitant d/t pain. Noted decreased knee flexion during gait causing gait deviations, will work on ambulation at next visit to decrease antalgic gait. Patient will continue to benefit from skilled PT services to modify and progress therapeutic interventions, address functional mobility deficits, address ROM deficits, address strength deficits, analyze and address soft tissue restrictions, and analyze and cue movement patterns to attain remaining goals. []  See Plan of Care  []  See progress note/recertification  []  See Discharge Summary         Progress towards goals / Updated goals:  Short Term Goals: To be accomplished in 5 treatments:  1.   Pt will be compliant and independent with HEP in order to facilitate PT sessions and aid with self management             Eval Status:  Initiated             Current Status: Met, reports compliance with HEP 12/5/22  2. Pt to tolerate 30 min or more of TE and/or Interventions w/o increased s/s             Eval Status:  Initiated             Current Status:     Long Term Goals: To be accomplished in 10 treatments:  1. Pt will report 50% improvement or better with dysfunction to show a significant increase in ability to tolerate ADL             Eval Status:  Initiated             Current Status:  2. Pt will have decreased pain at 3/10 or better for carry over to doing her usual household activities with less discomfort               Eval Status:  Pain 9/10             Current Status: Reports 7/10 pain today 12/5/22   3. Pt will demonstrate ambulation for 300' (I) with some discomfort for carryover to (I) ambulation between rooms in her home               Eval Status:  Amb Slow reciprocal gait, guarded with RW             Current Status: Patient amb without AD with antalgic gait 12/5/22  4. Pt will demonstrate ARoM left knee Flx to 120* or better for carryover to performing sit - stand from a standard height seat with little to no difficulty               Eval Status:  Knee flx 55*             Current Status: Knee lx continued at 89* 12/7/22  5.   Pt will improve FOTO score to 45 in 17 visits to show significant improvement in function for progress to performing normal household chores and other daily activity with little to no difficulty             Eval Status: FOTO = 21             Current Status:     PLAN  [x]  Upgrade activities as tolerated     [x]  Continue plan of care  []  Update interventions per flow sheet       []  Discharge due to:_  []  Other:_      Howard Peralta PTA 12/7/2022  10:25 AM    Future Appointments   Date Time Provider Hazel Vicente   12/9/2022  9:30 AM [facility] [place] [facility] 12/12/2022 11:00 AM Barbie Kee PTA [facility]   12/14/2022 11:45 AM [facility] [place] [facility]   12/16/2022 11:45 AM Barbie Kee PTA [facility]   12/19/2022  9:00 AM RICARDA Jerry AMB

## 2022-12-09 ENCOUNTER — HOSPITAL ENCOUNTER (OUTPATIENT)
Dept: PHYSICAL THERAPY | Age: 72
Discharge: HOME | End: 2022-12-09
Payer: MEDICARE

## 2022-12-09 ENCOUNTER — APPOINTMENT (OUTPATIENT)
Dept: PHYSICAL THERAPY | Age: 72
End: 2022-12-09
Payer: MEDICARE

## 2022-12-09 PROCEDURE — 97530 THERAPEUTIC ACTIVITIES: CPT

## 2022-12-09 PROCEDURE — 97110 THERAPEUTIC EXERCISES: CPT

## 2022-12-09 PROCEDURE — 97140 MANUAL THERAPY 1/> REGIONS: CPT

## 2022-12-09 NOTE — PROGRESS NOTES
PT DAILY TREATMENT NOTE     Patient Name: Kristin Pruitt  Date:2022  : 1950  [x]  Patient  Verified  Payor: [facility] MEDICARE / Plan: [address] / Product Type: Managed Care Medicare /    In time:9:34A  Out time:10:12  Total Treatment Time (min): 38  Visit #: 5 of 10    Medicare/BCBS Only   Total Timed Codes (min):  38 1:1 Treatment Time:  38       Treatment Area: Pain in left knee [M25.562]    SUBJECTIVE  Pain Level (0-10 scale): 6/10  Any medication changes, allergies to medications, adverse drug reactions, diagnosis change, or new procedure performed?: [x] No    [] Yes (see summary sheet for update)  Subjective functional status/changes:   [] No changes reported  Patient reports she still has difficulty bending her knee. Getting in the car makes her pain up to 10/10. She feels like the front her her knee is still very tight and she has a lot of swelling. She feels like she has a rock in her leg. She elevates is but cannot do so all day as she has to take care of her home. The compression garment did not help. She cannot walk her dog as much. OBJECTIVE     8 min Therapeutic Exercise:  [] See flow sheet : review of calf stretch for home, bike to increase knee flex ROM,    Rationale: increase ROM, increase strength, and increase proprioception to improve the patients ability to perfomr ADLs with increased ease    20 min Therapeutic Activity:  []  See flow sheet : patient education   Rationale: increase ROM, increase strength, improve coordination, improve balance, and increase proprioception  to improve the patients ability to perform ADLs with increased ease       10 min Manual Therapy:  effleurage to left ankle to distal thigh, patellar mobs    The manual therapy interventions were performed at a separate and distinct time from the therapeutic activities interventions.   Rationale: decrease pain, increase ROM, increase tissue extensibility, and decrease edema  to perform ADLs with increased ease              With   [] TE   [x] TA   [] neuro   [] other: Patient Education: [x] Review HEP    [] Progressed/Changed HEP based on:   [] positioning   [] body mechanics   [] transfers   [] heat/ice application    [] other: tubing/compression garment for swelling; elevating LEs above heart level to manage edema; edema contributing to decreased ROM; ambulating with SPC while emphasizing knee flex during swing for normal gait mechanics; keeping LEs closer together with car transfer to reduce strain to knee; desensitization     Other Objective/Functional Measures:      Subjective information obtained during bike  Ambulating with decreased left knee flex during swing phase and decreased left stance time  Edema to left knee  Min decreased patellar mobility with medial/lateral glide vs left   Max TTP with attempt at knee joint mobilizations for flex  Min TTP to proximal calf and distal hamstring musculature  Reviewed increasing knee flex with initial contact at heel strike for gait mechanics    Pain Level (0-10 scale) post treatment: 5/10    ASSESSMENT/Changes in Function:  Patient is 3 weeks, 1 day post op left TKA. She reports mod pain levels worsened with knee flex limiting tolerance to car transfers. She demonstrates edema to left LE contributing to limited ROM and overall tenderness worsened with prolonged activity. Patient would benefit from continued activities to manage LE edema, increase knee flex AROM, and promoted ambulation with normalized mechanics in order to improve standing tolerance/tolerance to daily tasks.      Patient will continue to benefit from skilled PT services to modify and progress therapeutic interventions, address functional mobility deficits, address ROM deficits, address strength deficits, analyze and address soft tissue restrictions, analyze and cue movement patterns, analyze and modify body mechanics/ergonomics, assess and modify postural abnormalities, address imbalance/dizziness, and instruct in home and community integration to attain remaining goals. Progress towards goals / Updated goals:  Short Term Goals: To be accomplished in 5 treatments:  1. Pt will be compliant and independent with HEP in order to facilitate PT sessions and aid with self management             Eval Status:  Initiated             Current Status: Met, reports compliance with HEP 12/5/22  2. Pt to tolerate 30 min or more of TE and/or Interventions w/o increased s/s             Eval Status:  Initiated             Current Status:     Long Term Goals: To be accomplished in 10 treatments:  1. Pt will report 50% improvement or better with dysfunction to show a significant increase in ability to tolerate ADL             Eval Status:  Initiated             Current Status:  2. Pt will have decreased pain at 3/10 or better for carry over to doing her usual household activities with less discomfort               Eval Status:  Pain 9/10             Current Status: Reports 7/10 pain today 12/5/22   3. Pt will demonstrate ambulation for 300' (I) with some discomfort for carryover to (I) ambulation between rooms in her home               Eval Status:  Amb Slow reciprocal gait, guarded with RW             Current Status: Patient amb without AD with antalgic gait 12/5/22  4. Pt will demonstrate ARoM left knee Flx to 120* or better for carryover to performing sit - stand from a standard height seat with little to no difficulty               Eval Status:  Knee flx 55*             Current Status: Knee lx continued at 89* 12/7/22  5.   Pt will improve FOTO score to 45 in 17 visits to show significant improvement in function for progress to performing normal household chores and other daily activity with little to no difficulty             Eval Status: FOTO = 21             Current Status:        PLAN  [x]  Upgrade activities as tolerated     [x]  Continue plan of care  [] Update interventions per flow sheet       []  Discharge due to:_  []  Other:_      Taisha Grey, PT 12/9/2022  9:01 AM    Future Appointments   Date Time Provider Hazel Vicente   12/9/2022  9:30 AM [address] [facility]   12/12/2022 11:00 AM Cristofer Aragon PTA [facility]   12/14/2022 11:45 AM [facility] PT [place] [facility]   12/16/2022 11:45 AM Cristofer Aragon PTA [facility]   12/19/2022  9:00 AM RICARDA Dsouza AMB

## 2022-12-12 ENCOUNTER — HOSPITAL ENCOUNTER (OUTPATIENT)
Dept: PHYSICAL THERAPY | Age: 72
Discharge: HOME | End: 2022-12-12
Payer: MEDICARE

## 2022-12-12 ENCOUNTER — TELEPHONE (OUTPATIENT)
Dept: ORTHOPEDIC SURGERY | Age: 72
End: 2022-12-12

## 2022-12-12 PROCEDURE — 97110 THERAPEUTIC EXERCISES: CPT

## 2022-12-12 PROCEDURE — 97140 MANUAL THERAPY 1/> REGIONS: CPT

## 2022-12-12 PROCEDURE — 97530 THERAPEUTIC ACTIVITIES: CPT

## 2022-12-12 NOTE — TELEPHONE ENCOUNTER
----- Message from Rubén Mendez sent at 12/12/2022  1:50 PM EST -----  Please call patient regarding some post op questions she has [phone]

## 2022-12-12 NOTE — PROGRESS NOTES
PT DAILY TREATMENT NOTE     Patient Name: Kristin Pruitt  Date:2022  : 1950  [x]  Patient  Verified  Payor: [facility] MEDICARE / Plan: [address] / Product Type: Managed Care Medicare /    In time:1101  Out time:1156  Total Treatment Time (min): 55  Visit #: 6 of 10    Medicare/BCBS Only   Total Timed Codes (min):  47 1:1 Treatment Time:  52       Treatment Area: Pain in left knee [M25.562]    SUBJECTIVE  Pain Level (0-10 scale): 6/10  Any medication changes, allergies to medications, adverse drug reactions, diagnosis change, or new procedure performed?: [x] No    [] Yes (see summary sheet for update)  Subjective functional status/changes:   [] No changes reported  Continues to report 6/10 pain.  Reports swelling goes down with ice and elevation at home but returns with movement     OBJECTIVE    Modality rationale: decrease edema, decrease inflammation, and decrease pain to improve the patients ability to perform ADLs   Min Type Additional Details    [] Estim:  []Unatt       []IFC  []Premod                        []Other:  []w/ice   []w/heat  Position:  Location:    [] Estim: []Att    []TENS instruct  []NMES                    []Other:  []w/US   []w/ice   []w/heat  Position:  Location:    []  Traction: [] Cervical       []Lumbar                       [] Prone          []Supine                       []Intermittent   []Continuous Lbs:  [] before manual  [] after manual    []  Ultrasound: []Continuous   [] Pulsed                           []1MHz   []3MHz W/cm2:  Location:    []  Iontophoresis with dexamethasone         Location: [] Take home patch   [] In clinic   8 [x]  Ice     []  heat  []  Ice massage  []  Laser   []  Anodyne Position: Long sitting   Location: left knee     []  Laser with stim  []  Other:  Position:  Location:    []  Vasopneumatic Device    []  Right     []  Left  Pre-treatment girth:  Post-treatment girth:  Measured at (location): Pressure:       [] lo [] med [] hi   Temperature: [] lo [] med [] hi   [x] Skin assessment post-treatment:  [x]intact []redness- no adverse reaction    []redness - adverse reaction:         10 min Therapeutic Exercise:  [x] See flow sheet :   Rationale: increase ROM and increase strength to improve the patients ability to perform ADLs    22 min Therapeutic Activity:  [x]  See flow sheet :   Rationale: increase ROM, increase strength, and improve coordination  to improve the patients ability to perform ADLs       15 min Manual Therapy:  Effleurage left ankle/calf/knee/thigh, PROM knee flexion with OP within pain range    The manual therapy interventions were performed at a separate and distinct time from the therapeutic activities interventions. Rationale: decrease pain, increase ROM, increase tissue extensibility, and decrease edema  to perform ADLs            With   [x] TE   [] TA   [] neuro   [] other: Patient Education: [x] Review HEP    [] Progressed/Changed HEP based on:   [] positioning   [] body mechanics   [] transfers   [] heat/ice application    [] other:      Other Objective/Functional Measures:   Knee flexion AROM supine 75* in seated 88*      Pain Level (0-10 scale) post treatment: 5/10    ASSESSMENT/Changes in Function: Patient actively participates in therapy. Continues to c/o swelling in the left knee limiting ADLs and sleep at night. Reports using ice throughout the night     Patient will continue to benefit from skilled PT services to modify and progress therapeutic interventions, address functional mobility deficits, address ROM deficits, address strength deficits, analyze and address soft tissue restrictions, analyze and cue movement patterns, and analyze and modify body mechanics/ergonomics to attain remaining goals. []  See Plan of Care  []  See progress note/recertification  []  See Discharge Summary         Progress towards goals / Updated goals:  Short Term Goals:  To be accomplished in 5 treatments:  1. Pt will be compliant and independent with HEP in order to facilitate PT sessions and aid with self management             Eval Status:  Initiated             Current Status: Met, reports compliance with HEP 12/5/22  2. Pt to tolerate 30 min or more of TE and/or Interventions w/o increased s/s             Eval Status:  Initiated             Current Status:     Long Term Goals: To be accomplished in 10 treatments:  1. Pt will report 50% improvement or better with dysfunction to show a significant increase in ability to tolerate ADL             Eval Status:  Initiated             Current Status:  2. Pt will have decreased pain at 3/10 or better for carry over to doing her usual household activities with less discomfort               Eval Status:  Pain 9/10             Current Status: Reports 7/10 pain today 12/5/22   3. Pt will demonstrate ambulation for 300' (I) with some discomfort for carryover to (I) ambulation between rooms in her home               Eval Status:  Amb Slow reciprocal gait, guarded with RW             Current Status: Patient amb without AD with antalgic gait 12/5/22  4. Pt will demonstrate ARoM left knee Flx to 120* or better for carryover to performing sit - stand from a standard height seat with little to no difficulty               Eval Status:  Knee flx 55*             Current Status: Knee flexion AROM supine 75* in seated 88* 12/12/22   5.   Pt will improve FOTO score to 45 in 17 visits to show significant improvement in function for progress to performing normal household chores and other daily activity with little to no difficulty             Eval Status: FOTO = 21             Current Status:     PLAN  [x]  Upgrade activities as tolerated     [x]  Continue plan of care  []  Update interventions per flow sheet       []  Discharge due to:_  []  Other:_      Paris Gutierrez, PTA 12/12/2022  11:08 AM    Future Appointments   Date Time Provider Hazel Vicente 12/14/2022 11:45 AM [facility] [place] [facility]   12/16/2022 11:45 AM Allyne Severe, PTA [facility]   12/19/2022  9:00 AM RICARDA Crawford AMB

## 2022-12-14 ENCOUNTER — HOSPITAL ENCOUNTER (OUTPATIENT)
Dept: PHYSICAL THERAPY | Age: 72
Discharge: HOME | End: 2022-12-14
Payer: MEDICARE

## 2022-12-14 PROCEDURE — 97140 MANUAL THERAPY 1/> REGIONS: CPT

## 2022-12-14 PROCEDURE — 97110 THERAPEUTIC EXERCISES: CPT

## 2022-12-14 NOTE — PROGRESS NOTES
PT DAILY TREATMENT NOTE     Patient Name: Doron Arriaga  Date:2022  : 1950  [x]  Patient  Verified  Payor: [employer] MEDICARE / Plan: [address] / Product Type: Managed Care Medicare /    In EANH:6818  Out time:1215  Total Treatment Time (min): 30  Visit #: 7 of 10    Medicare/BCBS Only   Total Timed Codes (min):  30 1:1 Treatment Time:  30       Treatment Area: Pain in left knee [M25.562]    SUBJECTIVE  Pain Level (0-10 scale): 4/10   Any medication changes, allergies to medications, adverse drug reactions, diagnosis change, or new procedure performed?: [x] No    [] Yes (see summary sheet for update)  Subjective functional status/changes:   [] No changes reported  Reports decreased pain and improved sleep. Patient spoke with PA about swelling in the leg, PA reassured patient everything was normal and advised patient to keep moving   Patient requests 30 min treatment today d/t personal plans     OBJECTIVE    22 min Therapeutic Exercise:  [x] See flow sheet :   Rationale: increase ROM and increase strength to improve the patients ability to perform ADLs      8 min Manual Therapy:  PROM with overpressure into flexion in seated    The manual therapy interventions were performed at a separate and distinct time from the therapeutic activities interventions.   Rationale: decrease pain, increase ROM, and increase tissue extensibility to perform ADLs            With   [x] TE   [] TA   [] neuro   [] other: Patient Education: [x] Review HEP    [] Progressed/Changed HEP based on:   [] positioning   [] body mechanics   [] transfers   [] heat/ice application    [] other:      Other Objective/Functional Measures:   Increased heel slides to 2x10   Increased quad sets to 15x   AROM knee flexion in seated position 90*   FOTO = 53     Pain Level (0-10 scale) post treatment: 0/10    ASSESSMENT/Changes in Function: Patient actively participates in therapy and tolerates progressions well with no c/o increased pain       Patient will continue to benefit from skilled PT services to modify and progress therapeutic interventions, address functional mobility deficits, address ROM deficits, address strength deficits, analyze and address soft tissue restrictions, and analyze and cue movement patterns to attain remaining goals. []  See Plan of Care  []  See progress note/recertification  []  See Discharge Summary         Progress towards goals / Updated goals:  Short Term Goals: To be accomplished in 5 treatments:  1. Pt will be compliant and independent with HEP in order to facilitate PT sessions and aid with self management             Eval Status:  Initiated             Current Status: Met, reports compliance with HEP 12/5/22  2. Pt to tolerate 30 min or more of TE and/or Interventions w/o increased s/s             Eval Status:  Initiated             Current Status:     Long Term Goals: To be accomplished in 10 treatments:  1. Pt will report 50% improvement or better with dysfunction to show a significant increase in ability to tolerate ADL             Eval Status:  Initiated             Current Status:  2. Pt will have decreased pain at 3/10 or better for carry over to doing her usual household activities with less discomfort               Eval Status:  Pain 9/10             Current Status: Reports 4/10 pain today 12/24/22   3. Pt will demonstrate ambulation for 300' (I) with some discomfort for carryover to (I) ambulation between rooms in her home               Eval Status:  Amb Slow reciprocal gait, guarded with RW             Current Status: Patient amb without AD with antalgic gait 12/5/22  4. Pt will demonstrate ARoM left knee Flx to 120* or better for carryover to performing sit - stand from a standard height seat with little to no difficulty               Eval Status:  Knee flx 55*             Current Status: Seated AROM 90* after MT 12/14/22   5.   Pt will improve FOTO score to 45 in 17 visits to show significant improvement in function for progress to performing normal household chores and other daily activity with little to no difficulty             Eval Status: FOTO = 21             Current Status: Met, FOTO = 53 12/14/22     PLAN  []  Upgrade activities as tolerated     []  Continue plan of care  []  Update interventions per flow sheet       []  Discharge due to:_  []  Other:_      Kaylen Gonzalez PTA 12/14/2022  11:56 AM    Future Appointments   Date Time Provider Hazel Vicente   12/16/2022 11:45 AM Harrison Morton PTA [place] [facility]   12/19/2022  9:00 AM RICARDA Booth AMB

## 2022-12-16 ENCOUNTER — HOSPITAL ENCOUNTER (OUTPATIENT)
Dept: PHYSICAL THERAPY | Age: 72
Discharge: HOME | End: 2022-12-16
Payer: MEDICARE

## 2022-12-16 PROCEDURE — 97530 THERAPEUTIC ACTIVITIES: CPT

## 2022-12-16 PROCEDURE — 97110 THERAPEUTIC EXERCISES: CPT

## 2022-12-16 NOTE — PROGRESS NOTES
[facility] at [address] [facility], [address]  Phone: [phone]      Fax:  [phone]    Continued Plan of Care/ Re-certification for Physical Therapy Services    Patient name: Ovi Brown Start of Care: 2022   Referral source: Jorge A Vargas MD : 1950               Medical Diagnosis: Pain in left knee [M25.562]  Payor: Margret Mt / Plan: [employer] / Product Type: Managed Care Medicare /  Onset Date:22               Treatment Diagnosis: Left Knee Pain   Prior Hospitalization: see medical history Provider#: [id number]   Medications: Verified on Patient summary List   Comorbidities: DM, OA,  Pacemaker, HTN, Hearing Impaired  Prior Level of Function: Frequent left knee pain                         Visits from Start of Care: 8    Missed Visits: 1    The Plan of Care and following information is based on the patient's current status:  Short Term Goals: To be accomplished in 5 treatments:  1. Pt will be compliant and independent with HEP in order to facilitate PT sessions and aid with self management             Eval Status:  Initiated             Current Status: Met, reports compliance with HEP   2.  Pt to tolerate 30 min or more of TE and/or Interventions w/o increased s/s             Eval Status:  Initiated             Current Status: Progressing, reports discomfort with knee flexion movement      Long Term Goals: To be accomplished in 10 treatments:  1. Pt will report 50% improvement or better with dysfunction to show a significant increase in ability to tolerate ADL             Eval Status:  Initiated             Current Status: Met, patient reports 50% improvement   2. Pt will have decreased pain at 3/10 or better for carry over to doing her usual household activities with less discomfort               Eval Status:  Pain 9/10             Current Status: Reports 5/10 pain today    3. Pt will demonstrate ambulation for 300' (I) with some discomfort for carryover to (I) ambulation between rooms in her home               Eval Status:  Amb Slow reciprocal gait, guarded with RW             Current Status: Patient amb without AD with antalgic gait   4. Pt will demonstrate ARoM left knee Flx to 120* or better for carryover to performing sit - stand from a standard height seat with little to no difficulty               Eval Status:  Knee flx 55*             Current Status: Seated AROM 90* after MT    5. Pt will improve FOTO score to 45 in 17 visits to show significant improvement in function for progress to performing normal household chores and other daily activity with little to no difficulty             Eval Status: FOTO = 21             Current Status: Met, FOTO = 53      Key functional changes: Patient has shown good progress with this treatment program. Pain as of last visit was 5/10. Patient has shown decreased pain and increased strength and mobility. Patient reports 75% improvement with overall involvement. FOTO score is 53. Problems/ barriers to goal attainment: None noted      Problem List: pain affecting function, decrease ROM, decrease strength, edema affecting function, impaired gait/ balance, decrease ADL/ functional abilitiies, decrease activity tolerance, decrease flexibility/ joint mobility, decrease transfer abilities, and other FOTO = 21       Treatment Plan may include any combination of the following: Therapeutic exercise, Neuromuscular reeducation, Manual therapy, Therapeutic activity, Self care/home management, Electric stim unattended , Gait training, Ultrasound, Mechanical traction, Electric stim attended, Needle insertion w/o injection (1 or 2 muscles), and Needle insertion w/o injection (3+ muscles)    Patient Goal (s) has been updated and includes: \"Get rid of the swelling\"     Goals for this certification period to be accomplished in 10 treatments:  1.   Pt to tolerate 30 min or more of TE and/or Interventions w/o increased s/s             Eval Status:  Progressing, reports discomfort with knee flexion movement              Current Status:   2. Pt will report 75% improvement or better with dysfunction to show a significant increase in ability to tolerate ADL             Eval Status:  patient reports 50% improvement              Current Status:   3. Pt will have decreased pain at 3/10 or better for carry over to doing her usual household activities with less discomfort               Eval Status: Reports 5/10 pain today               Current Status:   4. Pt will demonstrate ambulation for 300' (I) with some discomfort for carryover to (I) ambulation between rooms in her home               Eval Status:  Patient amb without AD with antalgic gait              Current Status:   5. Pt will demonstrate ARoM left knee Flx to 120* or better for carryover to performing sit - stand from a standard height seat with little to no difficulty               Eval Status:  Seated AROM 90* after MT              Current Status:      Frequency / Duration: Patient to be seen 2-3 times per week for 10 treatments:    Assessment / Recommendations:Patient is showing improved function with her current treatment program but it is not sustained to this point. Patient will continue to benefit from skilled PT to address impairments and continue to improve functional mobility and tolerance to daily activity. Certification Period: 12/16/22-01/14/2022     Samaria Avendaño, PTA 12/16/2022 11:54 AM    ________________________________________________________________________  I certify that the above Therapy Services are being furnished while the patient is under my care. I agree with the treatment plan and certify that this therapy is necessary. [] I have read the above and request that my patient continue as recommended.   [] I have read the above report and request that my patient continue therapy with the following changes/special instructions: ______________________________________  [] I have read the above report and request that my patient be discharged from therapy    Physician's Signature:____________Date:_________TIME:________     Noel Bennett MD  ** Signature, Date and Time must be completed for valid certification **    Please sign and return to [facility] at [address] [facility] [place] [address]. [facility] [address]  Phone: [phone]      Fax:  [phone]

## 2022-12-19 ENCOUNTER — VIRTUAL VISIT (OUTPATIENT)
Dept: ORTHOPEDIC SURGERY | Age: 72
End: 2022-12-19
Payer: MEDICARE

## 2022-12-19 DIAGNOSIS — Z96.652 STATUS POST LEFT KNEE REPLACEMENT: Primary | ICD-10-CM

## 2022-12-19 PROCEDURE — 99024 POSTOP FOLLOW-UP VISIT: CPT | Performed by: NURSE PRACTITIONER

## 2022-12-19 RX ORDER — ZINC GLUCONATE 50 MG
TABLET ORAL
COMMUNITY

## 2022-12-19 RX ORDER — CLINDAMYCIN HYDROCHLORIDE 300 MG/1
CAPSULE ORAL
COMMUNITY
Start: 2022-11-14

## 2022-12-19 NOTE — PROGRESS NOTES
Subjective:      Patient presents for postop care following left TKA. Surgery was on 11/17/2022. Ambulating independently. Pain is controlled with current analgesics. Medication(s) being used: prescription NSAID's including Celebrex, Percocet. Still having trouble improving her flexion and states when she walks, she feels like she's \"peg-legging it. \"    Objective: There were no vitals taken for this visit. General:  alert, cooperative, no distress, appears stated age   ROM: 0/95   Incision:   no erythema, well-healed, mild swelling     Assessment:     Doing well postoperatively. Plan:     1. Continue PT; focus on improving flexion. 2. May discontinue DVT prophylaxis. 3. Follow up in 6 weeks to reassess flexion. Johnfranny Orozco, who was evaluated through a synchronous (real-time) audio-video encounter, and/or her healthcare decision maker, is aware that it is a billable service, with coverage as determined by her insurance carrier. She provided verbal consent to proceed: Yes, and patient identification was verified. It was conducted pursuant to the emergency declaration under the [facility], [address] [place] [address] authority and the [email or web address] and [email or web address] Act. A caregiver was present when appropriate. Ability to conduct physical exam was limited. I was in the office. The patient was at home.

## 2022-12-21 ENCOUNTER — HOSPITAL ENCOUNTER (OUTPATIENT)
Dept: PHYSICAL THERAPY | Age: 72
Discharge: HOME | End: 2022-12-21
Payer: MEDICARE

## 2022-12-21 PROCEDURE — 97140 MANUAL THERAPY 1/> REGIONS: CPT

## 2022-12-21 PROCEDURE — 97110 THERAPEUTIC EXERCISES: CPT

## 2022-12-21 NOTE — PROGRESS NOTES
PT DAILY TREATMENT NOTE     Patient Name: Bryson Avila  Date:2022  : 1950  [x]  Patient  Verified  Payor: [facility] MEDICARE / Plan: [email or web address] / Product Type: Managed Care Medicare /    In time:1106  Out time:1145  Total Treatment Time (min): 39  Visit #: 2 of 10    Medicare/BCBS Only   Total Timed Codes (min):  38 1:1 Treatment Time:  39       Treatment Area: Pain in left knee [M25.562]    SUBJECTIVE  Pain Level (0-10 scale): 3  Any medication changes, allergies to medications, adverse drug reactions, diagnosis change, or new procedure performed?: [x] No    [] Yes (see summary sheet for update)  Subjective functional status/changes:   [] No changes reported  C/o stiffness, swelling and limited knee flx    OBJECTIVE      10 min Therapeutic Exercise:  [] See flow sheet :   Rationale: increase ROM, increase strength, and improve coordination to improve the patients ability to improve daily activity    29 min Manual Therapy:  Effleurage left knee, Left knee Flx/Ext PROM with overstretch, Left Knee Flx/Ext mobs, Scar massage   The manual therapy interventions were performed at a separate and distinct time from the therapeutic activities interventions.   Rationale: decrease pain, increase ROM, and increase tissue extensibility to tolerate increased mobility and activity          With   [x] TE   [] TA   [] neuro   [] other: Patient Education: [x] Review HEP    [] Progressed/Changed HEP based on:   [] positioning   [] body mechanics   [] transfers   [] heat/ice application    [] other:      Other Objective/Functional Measures:   - Increased edemal left knee along the medial angle of the knee up to the distal lateral quad  - AROM 73*  - PROM 87*  - Pt arrived ambulating with a left circumduction     Pain Level (0-10 scale) post treatment: 2    ASSESSMENT/Changes in Function:   - Good response to treatment with decreased edema after treatment  - Improved gait pattern with VCs    Patient will continue to benefit from skilled PT services to modify and progress therapeutic interventions, address functional mobility deficits, address ROM deficits, address strength deficits, analyze and address soft tissue restrictions, and analyze and cue movement patterns to attain remaining goals. []  See Plan of Care  []  See progress note/recertification  []  See Discharge Summary         Progress towards goals / Updated goals:  Goals for this certification period to be accomplished in 10 treatments:  1. Pt to tolerate 30 min or more of TE and/or Interventions w/o increased s/s             Eval Status:  Progressing, reports discomfort with knee flexion movement              Current Status:   2. Pt will report 75% improvement or better with dysfunction to show a significant increase in ability to tolerate ADL             Eval Status:  patient reports 50% improvement              Current Status:   3. Pt will have decreased pain at 3/10 or better for carry over to doing her usual household activities with less discomfort               Eval Status: Reports 5/10 pain today               Current Status: Pain 3/10 upon arrival today 12/21/22   4. Pt will demonstrate ambulation for 300' (I) with some discomfort for carryover to (I) ambulation between rooms in her home               Eval Status:  Patient amb without AD with antalgic gait              Current Status: Progressing at 100' with improved knee flx 12/21/22  5. Pt will demonstrate ARoM left knee Flx to 120* or better for carryover to performing sit - stand from a standard height seat with little to no difficulty               Eval Status:  Seated AROM 90* after MT              Current Status: Increased tightness of the left knee.   AROM 73* PROM 87* after treatment 12/21/22     PLAN  []  Upgrade activities as tolerated     []  Continue plan of care  []  Update interventions per flow sheet       []  Discharge due to:_  [] Other:_      Evelin Harris, PT 12/21/2022  11:16 AM    Future Appointments   Date Time Provider Hazel Vicente   12/23/2022  3:30 PM Steven Lazo [facility]   12/28/2022 11:45 AM Barbie Kee PTA [facility]   12/30/2022 11:45 AM Barbie Kee PTA [facility]   1/30/2023  9:00 AM RICARDA Jerry AMB

## 2022-12-23 ENCOUNTER — HOSPITAL ENCOUNTER (OUTPATIENT)
Dept: PHYSICAL THERAPY | Age: 72
Discharge: HOME | End: 2022-12-23
Payer: MEDICARE

## 2022-12-23 PROCEDURE — 97530 THERAPEUTIC ACTIVITIES: CPT

## 2022-12-23 PROCEDURE — 97110 THERAPEUTIC EXERCISES: CPT

## 2022-12-23 PROCEDURE — 97140 MANUAL THERAPY 1/> REGIONS: CPT

## 2022-12-23 NOTE — PROGRESS NOTES
PT DAILY TREATMENT NOTE     Patient Name: Gold Meyer  Date:2022  : 1950  [x]  Patient  Verified  Payor: [employer] [facility]IC[facility] / Plan: [address] / Product Type: Managed Care Medicare /    In time:2:48  Out time:3:40  Total Treatment Time (min): 52  Visit #: 3 of 10    Medicare/BCBS Only   Total Timed Codes (min):  42 1:1 Treatment Time:  42       Treatment Area: Pain in left knee [M25.562]    SUBJECTIVE  Pain Level (0-10 scale): 8  Any medication changes, allergies to medications, adverse drug reactions, diagnosis change, or new procedure performed?: [x] No    [] Yes (see summary sheet for update)  Subjective functional status/changes:   [] No changes reported  Increased pain with feeling of a heavy feeling in the left LE.     OBJECTIVE    Modality rationale: decrease inflammation and decrease pain to improve the patients ability to tolerate increased activity   Min Type Additional Details    [] Estim:  []Unatt       []IFC  []Premod                        []Other:  []w/ice   []w/heat  Position:  Location:    [] Estim: []Att    []TENS instruct  []NMES                    []Other:  []w/US   []w/ice   []w/heat  Position:  Location:    []  Traction: [] Cervical       []Lumbar                       [] Prone          []Supine                       []Intermittent   []Continuous Lbs:  [] before manual  [] after manual    []  Ultrasound: []Continuous   [] Pulsed                           []1MHz   []3MHz W/cm2:  Location:    []  Iontophoresis with dexamethasone         Location: [] Take home patch   [] In clinic   10 [x]  Ice     []  heat  []  Ice massage  []  Laser   []  Anodyne Position: Right SL  Location: Left Hip/Piriformis/lateral thigh    []  Laser with stim  []  Other:  Position:  Location:    []  Vasopneumatic Device    []  Right     []  Left  Pre-treatment girth:  Post-treatment girth:  Measured at (location):  Pressure:       [] lo [] med [] hi Temperature: [] lo [] med [] hi   [x] Skin assessment post-treatment:  [x]intact []redness- no adverse reaction    []redness - adverse reaction:         14 min Therapeutic Exercise:  [x] See flow sheet :   Rationale: increase ROM, increase strength, and improve coordination to improve the patients ability to improve activity tolerance    10 min Therapeutic Activity:  []  See flow sheet :   Rationale: increase ROM, increase strength, and improve coordination  to improve the patients ability to improve daily activity     18 min Manual Therapy:  Rolling the Left Lateral thigh, piriformis and peroneal region   The manual therapy interventions were performed at a separate and distinct time from the therapeutic activities interventions. Rationale: decrease pain, increase ROM, increase tissue extensibility, and decrease trigger points to improve daily function          With   [x] TE   [x] TA   [] neuro   [] other: Patient Education: [x] Review HEP    [] Progressed/Changed HEP based on:   [] positioning   [] body mechanics   [] transfers   [] heat/ice application    [] other:      Other Objective/Functional Measures:   - TTP with muscle tightness at the left Piriformis/Lateral thigh/Peroneal/HS  -  Trial Roll the left lateral thigh and peroneal   Access Code: [email or web address]  URL: [email or web address]  Date: 12/23/2022  Prepared by: Charles Burdick    Exercises  Seated Piriformis Stretch - 2 x daily - 7 x weekly - 3 sets - 10 reps  Sidelying Piriformis Stretch - 2 x daily - 7 x weekly - 3 sets - 10 reps  Sciatic Nerve Glide - 2 x daily - 7 x weekly - 3 sets - 20 reps  Supine Sciatic Nerve Glide - 2 x daily - 7 x weekly - 3 sets - 10 reps    Pain Level (0-10 scale) post treatment: 6    ASSESSMENT/Changes in Function:   - Good response to use of MT and Neural flossing    Patient will continue to benefit from skilled PT services to modify and progress therapeutic interventions, address functional mobility deficits, address ROM deficits, address strength deficits, analyze and address soft tissue restrictions, and analyze and cue movement patterns to attain remaining goals. []  See Plan of Care  []  See progress note/recertification  []  See Discharge Summary         Progress towards goals / Updated goals:  Goals for this certification period to be accomplished in 10 treatments:  1. Pt to tolerate 30 min or more of TE and/or Interventions w/o increased s/s             Eval Status:  Progressing, reports discomfort with knee flexion movement              Current Status:   2. Pt will report 75% improvement or better with dysfunction to show a significant increase in ability to tolerate ADL             Eval Status:  patient reports 50% improvement              Current Status:   3. Pt will have decreased pain at 3/10 or better for carry over to doing her usual household activities with less discomfort               Eval Status: Reports 5/10 pain today               Current Status: Pain 3/10 upon arrival today 12/21/22   4. Pt will demonstrate ambulation for 300' (I) with some discomfort for carryover to (I) ambulation between rooms in her home               Eval Status:  Patient amb without AD with antalgic gait              Current Status: Progressing at 1x300 and 1x500' with less discomfort 12/23/22  5. Pt will demonstrate ARoM left knee Flx to 120* or better for carryover to performing sit - stand from a standard height seat with little to no difficulty               Eval Status:  Seated AROM 90* after MT              Current Status: Increased tightness of the left knee.   AROM 73* PROM 87* after treatment 12/21/22        PLAN  [x]  Upgrade activities as tolerated     [x]  Continue plan of care  []  Update interventions per flow sheet       []  Discharge due to:_  []  Other:_      Zoey Sarabia, PT 12/23/2022  2:54 PM    Future Appointments   Date Time Provider Hazel Vicente   12/28/2022 11:45 AM Valdez Corbin Alison Ramos [facility]   12/30/2022 11:45 AM Aracelis Soto PTA [facility]   1/30/2023  9:00 AM RICARDA Chang AMB

## 2022-12-28 ENCOUNTER — HOSPITAL ENCOUNTER (OUTPATIENT)
Dept: PHYSICAL THERAPY | Age: 72
Discharge: HOME | End: 2022-12-28
Payer: MEDICARE

## 2022-12-28 PROCEDURE — 97530 THERAPEUTIC ACTIVITIES: CPT

## 2022-12-28 PROCEDURE — 97110 THERAPEUTIC EXERCISES: CPT

## 2022-12-28 NOTE — PROGRESS NOTES
PT DAILY TREATMENT NOTE     Patient Name: Robert Reis  Date:2022  : 1950  [x]  Patient  Verified  Payor: [facility] MEDICARE / Plan: [address] / Product Type: Managed Care Medicare /    In time:0850  Out XQNR:[id number]  Total Treatment Time (min): 41  Visit #: 4 of 10    Medicare/BCBS Only   Total Timed Codes (min):  41 1:1 Treatment Time:  41       Treatment Area: Pain in left knee [M25.562]    SUBJECTIVE  Pain Level (0-10 scale): 0  Any medication changes, allergies to medications, adverse drug reactions, diagnosis change, or new procedure performed?: [x] No    [] Yes (see summary sheet for update)  Subjective functional status/changes:   [] No changes reported  No pain but stiffness level is 10/10    OBJECTIVE          25 min Therapeutic Exercise:  [x] See flow sheet :   Rationale: increase ROM, increase strength, and improve coordination to improve the patients ability to tolerate increased activity    8 min Therapeutic Activity:  [x]  See flow sheet :   Rationale: increase ROM, increase strength, and improve coordination  to improve the patients ability to perform increased activity     8 min Manual Therapy:  Knee Flx overstretch with PROM   The manual therapy interventions were performed at a separate and distinct time from the therapeutic activities interventions.   Rationale: decrease pain, increase ROM, and increase tissue extensibility to tolerate increased activity          With   [x] TE   [x] TA   [] neuro   [] other: Patient Education: [x] Review HEP    [] Progressed/Changed HEP based on:   [] positioning   [] body mechanics   [] transfers   [] heat/ice application    [] other:      Other Objective/Functional Measures:   - Increased focus on exercises to aid with mobility and limit stiffness  - AROM Left knee Flx 50* with guarding  - Add prolonged knee flx str with AROM progressing to 88*     Pain Level (0-10 scale) post treatment: 0    ASSESSMENT/Changes in Function:   - Good response to treatment with stiffness level at 6-7/10  - Increased focus an knee flx and relaxation in a prolonged str  - Improved tolerance to exercises today  - Discussed limiting discussions about the stiffness of the knee and focus on treatments to aid with stiffness    Patient will continue to benefit from skilled PT services to modify and progress therapeutic interventions, address functional mobility deficits, address ROM deficits, address strength deficits, analyze and address soft tissue restrictions, and analyze and cue movement patterns to attain remaining goals. []  See Plan of Care  []  See progress note/recertification  []  See Discharge Summary         Progress towards goals / Updated goals:  Goals for this certification period to be accomplished in 10 treatments:  1. Pt to tolerate 30 min or more of TE and/or Interventions w/o increased s/s             Eval Status:  Progressing, reports discomfort with knee flexion movement              Current Status:   2. Pt will report 75% improvement or better with dysfunction to show a significant increase in ability to tolerate ADL             Eval Status:  patient reports 50% improvement              Current Status:   3. Pt will have decreased pain at 3/10 or better for carry over to doing her usual household activities with less discomfort               Eval Status: Reports 5/10 pain today               Current Status: Pain 0/10 upon arrival today 12/28/22   4. Pt will demonstrate ambulation for 300' (I) with some discomfort for carryover to (I) ambulation between rooms in her home               Eval Status:  Patient amb without AD with antalgic gait              Current Status: Progressing at 1x300 and 1x500' with less discomfort 12/23/22  5.   Pt will demonstrate ARoM left knee Flx to 120* or better for carryover to performing sit - stand from a standard height seat with little to no difficulty Eval Status:  Seated AROM 90* after MT              Current Status: Increased tightness of the left knee.   AROM 50* upon arrival, increasing to AROM over bolster at 80* after treatment 12/28/22        PLAN  [x]  Upgrade activities as tolerated     [x]  Continue plan of care  []  Update interventions per flow sheet       []  Discharge due to:_  []  Other:_      Tamanna Rosales, PT 12/28/2022  8:59 AM    Future Appointments   Date Time Provider Hazel Vicente   12/30/2022 11:45 AM Thierno Wang PTA [facility]   1/30/2023  9:00 AM RICARDA Calixto AMB

## 2022-12-30 ENCOUNTER — HOSPITAL ENCOUNTER (OUTPATIENT)
Dept: PHYSICAL THERAPY | Age: 72
End: 2022-12-30
Payer: MEDICARE

## 2022-12-30 ENCOUNTER — TELEPHONE (OUTPATIENT)
Dept: ORTHOPEDIC SURGERY | Age: 72
End: 2022-12-30

## 2022-12-30 PROCEDURE — 97110 THERAPEUTIC EXERCISES: CPT

## 2022-12-30 PROCEDURE — 97530 THERAPEUTIC ACTIVITIES: CPT

## 2022-12-30 PROCEDURE — 97140 MANUAL THERAPY 1/> REGIONS: CPT

## 2022-12-30 NOTE — PROGRESS NOTES
PT DAILY TREATMENT NOTE     Patient Name: Edil Landeros  Date:2022  : 1950  [x]  Patient  Verified  Payor: [place] [employer]IC[employer] / Plan: [address] / Product Type: Managed Care Medicare /    In GDTU:[id number]  Out time:1229  Total Treatment Time (min): 42  Visit #: 5 of 10    Medicare/BCBS Only   Total Timed Codes (min):  42 1:1 Treatment Time:  42       Treatment Area: Pain in left knee [M25.562]    SUBJECTIVE  Pain Level (0-10 scale): 0/10  Any medication changes, allergies to medications, adverse drug reactions, diagnosis change, or new procedure performed?: [x] No    [] Yes (see summary sheet for update)  Subjective functional status/changes:   [] No changes reported  \"The pain is manageable. It's the stiffness\"     OBJECTIVE      20 min Therapeutic Exercise:  [x] See flow sheet :   Rationale: increase ROM and increase strength to improve the patients ability to perform ADLs    12 min Therapeutic Activity:  [x]  See flow sheet :   Rationale: increase ROM, increase strength, and improve coordination  to improve the patients ability to perform ADLs       10 min Manual Therapy:  PROM knee flexion with over pressure, scar massage    The manual therapy interventions were performed at a separate and distinct time from the therapeutic activities interventions. Rationale: decrease pain, increase ROM, and increase tissue extensibility to perform ADLs            With   [x] TE   [] TA   [] neuro   [] other: Patient Education: [x] Review HEP    [] Progressed/Changed HEP based on:   [] positioning   [] body mechanics   [] transfers   [] heat/ice application    [] other:      Other Objective/Functional Measures:   Cues for relaxation with prolonged knee flexion      Pain Level (0-10 scale) post treatment: 4/10    ASSESSMENT/Changes in Function: Patient actively participates in therapy. Reports some increased pain after treatment but declines any modalities.  States she is seeing surgeon next Friday, reports doing her HEP but continues to c/o constant stiffness and feeling of \"concrete\" in the knee     Patient will continue to benefit from skilled PT services to modify and progress therapeutic interventions, address functional mobility deficits, address ROM deficits, address strength deficits, analyze and address soft tissue restrictions, and analyze and cue movement patterns to attain remaining goals. []  See Plan of Care  []  See progress note/recertification  []  See Discharge Summary         Progress towards goals / Updated goals:  Goals for this certification period to be accomplished in 10 treatments:  1. Pt to tolerate 30 min or more of TE and/or Interventions w/o increased s/s             Eval Status:  Progressing, reports discomfort with knee flexion movement              Current Status:   2. Pt will report 75% improvement or better with dysfunction to show a significant increase in ability to tolerate ADL             Eval Status:  patient reports 50% improvement              Current Status:   3. Pt will have decreased pain at 3/10 or better for carry over to doing her usual household activities with less discomfort               Eval Status: Reports 5/10 pain today               Current Status: Pain 0/10 upon arrival today 12/28/22   4. Pt will demonstrate ambulation for 300' (I) with some discomfort for carryover to (I) ambulation between rooms in her home               Eval Status:  Patient amb without AD with antalgic gait              Current Status: Progressing at 1x300 and 1x500' with less discomfort 12/23/22  5. Pt will demonstrate ARoM left knee Flx to 120* or better for carryover to performing sit - stand from a standard height seat with little to no difficulty               Eval Status:  Seated AROM 90* after MT              Current Status: Increased tightness of the left knee.   AROM 50* upon arrival, increasing to AROM over bolster at 88* after treatment 12/28/22     PLAN  [x]  Upgrade activities as tolerated     [x]  Continue plan of care  []  Update interventions per flow sheet       []  Discharge due to:_  []  Other:_      Christine Garnett PTA 12/30/2022  11:49 AM    Future Appointments   Date Time Provider Hazel Vicente   1/3/2023  4:00 PM Becca Amaya, [place] [facility]   1/6/2023 11:00 AM Blayne Tadeo, PTA [facility]   1/6/2023  2:10 PM MD LYNNETTE Escobedo BS AMB   1/10/2023  4:00 PM Becca Amaya, PT [facility]   1/13/2023  3:00 PM Blayne Tadeo, PTA [facility]   1/17/2023  4:00 PM Becca Amaya, PT [facility]   1/20/2023 12:30 PM Blayne Tadeo PTA [facility]   1/24/2023  1:30 PM Becca Amaya, PT [facility]   1/27/2023 11:00 AM Blayne Tadeo, PTA [facility]   1/30/2023  9:00 AM RICARDA Murphy BS AMB   1/31/2023  1:00 PM Becca Amaya, PT [facility]

## 2023-01-03 ENCOUNTER — HOSPITAL ENCOUNTER (OUTPATIENT)
Dept: PHYSICAL THERAPY | Age: 73
Discharge: HOME | End: 2023-01-03
Payer: MEDICARE

## 2023-01-03 PROCEDURE — 97110 THERAPEUTIC EXERCISES: CPT

## 2023-01-03 PROCEDURE — 97530 THERAPEUTIC ACTIVITIES: CPT

## 2023-01-03 NOTE — PROGRESS NOTES
PT DAILY TREATMENT NOTE     Patient Name: Tavia Mcclure  Date:1/3/2023  : 1950  [x]  Patient  Verified  Payor: Carla Hernandez / Plan: [address] / Product Type: Managed Care Medicare /    In time:4:07  Out time:4:55  Total Treatment Time (min): 48  Visit #: 6 of 10    Medicare/BCBS Only   Total Timed Codes (min):  48 1:1 Treatment Time:  48       Treatment Area: Pain in left knee [M25.562]    SUBJECTIVE  Pain Level (0-10 scale): 2  Any medication changes, allergies to medications, adverse drug reactions, diagnosis change, or new procedure performed?: [x] No    [] Yes (see summary sheet for update)  Subjective functional status/changes:   [] No changes reported  Still stiff in the left knee. Will go to see the surgeon on Friday. OBJECTIVE      24 min Therapeutic Exercise:  [x] See flow sheet :   Rationale: increase ROM, increase strength, and improve coordination to improve the patients ability to perform increased activity    9 min Therapeutic Activity:  [x]  See flow sheet :   Rationale: increase ROM, increase strength, and improve coordination  to improve the patients ability to perform increased activity levels     15 min Manual Therapy:  Knee Flx/Ext Mobs, Knee Flx PROM with overstretch   The manual therapy interventions were performed at a separate and distinct time from the therapeutic activities interventions.   Rationale: decrease pain, increase ROM, and increase tissue extensibility to improve daily functin          With   [x] TE   [x] TA   [] neuro   [] other: Patient Education: [x] Review HEP    [] Progressed/Changed HEP based on:   [] positioning   [] body mechanics   [] transfers   [] heat/ice application    [] other:      Other Objective/Functional Measures:   - AROM Left Knee Flx 75*     Pain Level (0-10 scale) post treatment: 1    ASSESSMENT/Changes in Function:   - AROM left knee Flx 88* with prolonged stretch    Patient will continue to benefit from skilled PT services to modify and progress therapeutic interventions, address functional mobility deficits, address ROM deficits, address strength deficits, analyze and address soft tissue restrictions, and analyze and cue movement patterns to attain remaining goals. []  See Plan of Care  []  See progress note/recertification  []  See Discharge Summary         Progress towards goals / Updated goals:  Goals for this certification period to be accomplished in 10 treatments:  1. Pt to tolerate 30 min or more of TE and/or Interventions w/o increased s/s             Eval Status:  Progressing, reports discomfort with knee flexion movement              Current Status:   2. Pt will report 75% improvement or better with dysfunction to show a significant increase in ability to tolerate ADL             Eval Status:  patient reports 50% improvement              Current Status:   3. Pt will have decreased pain at 3/10 or better for carry over to doing her usual household activities with less discomfort               Eval Status: Reports 5/10 pain today               Current Status: Pain 2/10 upon arrival today 1/3/23  4. Pt will demonstrate ambulation for 300' (I) with some discomfort for carryover to (I) ambulation between rooms in her home               Eval Status:  Patient amb without AD with antalgic gait              Current Status: Progressing at 1x300 and 1x500' with less discomfort 12/23/22  5.   Pt will demonstrate ARoM left knee Flx to 120* or better for carryover to performing sit - stand from a standard height seat with little to no difficulty               Eval Status:  Seated AROM 90* after MT              Current Status: AROM Left Knee Flx 75* progressing to 88 at end of treatment 1/3/23       PLAN  [x]  Upgrade activities as tolerated     [x]  Continue plan of care  []  Update interventions per flow sheet       []  Discharge due to:_  []  Other:_      Arina Pearce PT 1/3/2023  4:37 PM    Future Appointments   Date Time Provider Hazel Vicente   1/6/2023 11:00 AM Jarvis Carreon, PTA [facility]   1/6/2023  2:10 PM MD LYNNETTE Mae BS AMB   1/10/2023  4:00 PM Ruby Found, PT [facility]   1/13/2023  3:00 PM Jarvis Carreon, PTA [facility]   1/17/2023  4:00 PM Ruby Found, [place] [facility]   1/20/2023 12:30 PM Jarvis Carreon, PTA [facility]   1/24/2023  1:30 PM Ruby Found, PT [facility]   1/27/2023 11:00 AM Jarvis Carreon, PTA [facility]   1/30/2023  9:00 AM RICARDA Umanzor BS AMB   1/31/2023  1:00 PM Ruby Found, PT [facility]

## 2023-01-06 ENCOUNTER — HOSPITAL ENCOUNTER (OUTPATIENT)
Dept: PHYSICAL THERAPY | Age: 73
Discharge: HOME | End: 2023-01-06
Payer: MEDICARE

## 2023-01-06 ENCOUNTER — OFFICE VISIT (OUTPATIENT)
Dept: ORTHOPEDIC SURGERY | Age: 73
End: 2023-01-06
Payer: MEDICARE

## 2023-01-06 DIAGNOSIS — Z96.652 STATUS POST LEFT KNEE REPLACEMENT: Primary | ICD-10-CM

## 2023-01-06 DIAGNOSIS — M25.562 LEFT KNEE PAIN, UNSPECIFIED CHRONICITY: ICD-10-CM

## 2023-01-06 DIAGNOSIS — Z96.652 STATUS POST TOTAL LEFT KNEE REPLACEMENT: Primary | ICD-10-CM

## 2023-01-06 PROCEDURE — 97140 MANUAL THERAPY 1/> REGIONS: CPT

## 2023-01-06 PROCEDURE — 99024 POSTOP FOLLOW-UP VISIT: CPT | Performed by: ORTHOPAEDIC SURGERY

## 2023-01-06 PROCEDURE — 97530 THERAPEUTIC ACTIVITIES: CPT

## 2023-01-06 PROCEDURE — 97110 THERAPEUTIC EXERCISES: CPT

## 2023-01-06 RX ORDER — OXYCODONE AND ACETAMINOPHEN 5; 325 MG/1; MG/1
1 TABLET ORAL
Qty: 30 TABLET | Refills: 0 | Status: SHIPPED | OUTPATIENT
Start: 2023-01-06 | End: 2023-01-16

## 2023-01-06 NOTE — PROGRESS NOTES
Name: Emily Delong    : 1950     Service Dept: [facility]    Chief Complaint   Patient presents with    Surgical Follow-up    Knee Pain        There were no vitals taken for this visit. Allergies   Allergen Reactions    Latex Rash    Aleve [Naproxen Sodium] Anaphylaxis    Nickel Rash    Penicillins Shortness of Breath and Rash    Ragweed Sneezing    Sulfa (Sulfonamide Antibiotics) Rash        Current Outpatient Medications   Medication Sig Dispense Refill    clindamycin (CLEOCIN) 300 mg capsule take 1 capsule by mouth every 8 hours for 3 days START AFTER SUREGRY      zinc gluconate 50 mg tablet 1 tablet      losartan (COZAAR) 100 mg tablet 1 tablet Orally Once a day      sertraline (ZOLOFT) 50 mg tablet 1 tablet Orally Once a day      simvastatin (ZOCOR) 20 mg tablet 1 tablet in the evening Orally Once a day      gabapentin (NEURONTIN) 300 mg capsule Take 1 Capsule by mouth nightly as needed for Pain. Max Daily Amount: 300 mg. Indications: neuropathic pain, acute pain following an operation 30 Capsule 1    irbesartan-hydroCHLOROthiazide (AVALIDE) 150-12.5 mg per tablet Take 1 Tablet by mouth daily. celecoxib (CELEBREX) 200 mg capsule 1 capsule with food      cholecalciferol (VITAMIN D3) (5000 Units/125 mcg) tab tablet Take 5,000 Units by mouth daily. pantoprazole (PROTONIX) 40 mg tablet 1 tablet      lutein-zeaxanthin 20 mg- 1,000 mcg cap Take 1 Capsule by mouth daily. pravastatin (PRAVACHOL) 80 mg tablet 1 Tablet daily. vit A,C,E-zinc-copper (PreserVision AREDS) cap capsule as directed.         Patient Active Problem List   Diagnosis Code    OA (osteoarthritis) of knee M17.9      Family History   Problem Relation Age of Onset    No Known Problems Mother     No Known Problems Father       Social History     Socioeconomic History    Marital status:    Tobacco Use    Smoking status: Former     Packs/day: 1.00     Years: 15.00 Pack years: 15.00     Types: Cigarettes     Quit date:      Years since quittin.0    Smokeless tobacco: Never   Vaping Use    Vaping Use: Never used   Substance and Sexual Activity    Alcohol use: Not Currently    Drug use: Never    Sexual activity: Not Currently      History reviewed. No pertinent surgical history. Past Medical History:   Diagnosis Date    High cholesterol     Hypertension     Osteoporosis         I have reviewed and agree with [facility] and ROS and intake form in chart and the record furthermore I have reviewed prior medical record(s) regarding this patients care during this appointment. Review of Systems:   Patient is a pleasant appearing individual, appropriately dressed, well hydrated, well nourished, who is alert, appropriately oriented for age, and in no acute distress with a normal gait and normal affect who does not appear to be in any significant pain. Physical Exam:  Right knee - Neurovascularly intact with good cap refill, full range of motion and full strength, well healed incision noted, no swelling, no erythema, no instability, with stiffness    Left knee - Decrease range of motion with flexion, Some crepitation, Grossly neurovascularly intact, Good cap refill, No skin lesion, Moderate swelling, No gross instability, Some quadriceps weakness   Encounter Diagnoses     ICD-10-CM ICD-9-CM   1. Left knee pain, unspecified chronicity  M25.562 719.46       HPI:  The patient is here status post left total knee replacement. Surgery was on 2022. She is status post left total knee replacement, still having some stiffness, very apprehensive on moving it. Assessment/Plan:  Plan at this point, my recommendation will be for left knee manipulation case #0 on in about a week and a half and we will go from there. We will give her 1 pain medication refill in the meantime.     As part of continued conservative pain management options the patient was advised to utilize Tylenol or OTC NSAIDS as long as it is not medically contraindicated. Return to Office: Follow-up and Dispositions    Return for schedule for surgery. Scribed by Armin Larios LPN as dictated by [facility]PON[facility] [place] JAJA Lu MD.  Documentation, performed by, True and Accepted Thor Lu MD

## 2023-01-06 NOTE — PROGRESS NOTES
PT DAILY TREATMENT NOTE     Patient Name: Shasta Espinosa  Date:2023  : 1950  [x]  Patient  Verified  Payor: [employer] MEDICARE / Plan: [address] / Product Type: Managed Care Medicare /    In time:1100  Out time:1142  Total Treatment Time (min): 42  Visit #: 7 of 10    Medicare/BCBS Only   Total Timed Codes (min):  42 1:1 Treatment Time:  42       Treatment Area: Pain in left knee [M25.562]    SUBJECTIVE  Pain Level (0-10 scale): 3/10  Any medication changes, allergies to medications, adverse drug reactions, diagnosis change, or new procedure performed?: [x] No    [] Yes (see summary sheet for update)  Subjective functional status/changes:   [] No changes reported  Patient reports \"the last 2 days have been so hard. I just feel like I have a wooden leg. And walking only makes it worse\"     OBJECTIVE    24 min Therapeutic Exercise:  [x] See flow sheet :   Rationale: increase ROM and increase strength to improve the patients ability to perform ADLs    10 min Therapeutic Activity:  [x]  See flow sheet :   Rationale: increase ROM, increase strength, and improve coordination  to improve the patients ability to perform ADLs     8 min Manual Therapy:  PROM knee flx with OP    The manual therapy interventions were performed at a separate and distinct time from the therapeutic activities interventions. Rationale: increase ROM and increase tissue extensibility to perform ADLs            With   [x] TE   [] TA   [] neuro   [] other: Patient Education: [x] Review HEP    [] Progressed/Changed HEP based on:   [] positioning   [] body mechanics   [] transfers   [] heat/ice application    [] other:      Other Objective/Functional Measures:   Prolonged knee flexion with slight OP for 5 min      Pain Level (0-10 scale) post treatment: 0/10    ASSESSMENT/Changes in Function: Patient actively participates in therapy. Unable to perform bike today.  However, reports decreased pain after therapy. Patient seeing surgeon today d/t continued complaints of stiffness limiting ambulation. Patient will continue to benefit from skilled PT services to modify and progress therapeutic interventions, address functional mobility deficits, address ROM deficits, address strength deficits, analyze and address soft tissue restrictions, analyze and cue movement patterns, and analyze and modify body mechanics/ergonomics to attain remaining goals. []  See Plan of Care  []  See progress note/recertification  []  See Discharge Summary         Progress towards goals / Updated goals:  Goals for this certification period to be accomplished in 10 treatments:  1. Pt to tolerate 30 min or more of TE and/or Interventions w/o increased s/s             Eval Status:  Progressing, reports discomfort with knee flexion movement              Current Status:   2. Pt will report 75% improvement or better with dysfunction to show a significant increase in ability to tolerate ADL             Eval Status:  patient reports 50% improvement              Current Status:   3. Pt will have decreased pain at 3/10 or better for carry over to doing her usual household activities with less discomfort               Eval Status: Reports 5/10 pain today               Current Status: Pain 3/10 at the start of treatment 1/6/23  4. Pt will demonstrate ambulation for 300' (I) with some discomfort for carryover to (I) ambulation between rooms in her home               Eval Status:  Patient amb without AD with antalgic gait              Current Status: Progressing at 1x300 and 1x500' with less discomfort 12/23/22  5.   Pt will demonstrate ARoM left knee Flx to 120* or better for carryover to performing sit - stand from a standard height seat with little to no difficulty               Eval Status:  Seated AROM 90* after MT              Current Status: AROM Left Knee Flx 75* progressing to 88 at end of treatment 1/3/23       PLAN  [x] Upgrade activities as tolerated     [x]  Continue plan of care  []  Update interventions per flow sheet       []  Discharge due to:_  []  Other:_      Benjamín Funez, PTA 1/6/2023  11:20 AM    Future Appointments   Date Time Provider Hazel Vicente   1/6/2023  2:10 PM Claudene Mixer, MD [facility] BS AMB   1/10/2023  4:00 PM Liang Warren, PT [facility]   1/13/2023  3:00 PM Ariadne Darling, [place] [facility]   1/17/2023  4:00 PM Liang Warren, PT [facility]   1/20/2023 12:30 PM Ariadne Darling, [place] [facility]   1/24/2023  1:30 PM Liang Warren, PT [facility]   1/27/2023 11:00 AM Ariadne Darling, [place] [facility]   1/30/2023  9:00 AM RICARDA Dixon BS AMB   1/31/2023  1:00 PM Liang Warren, PT [facility]

## 2023-01-06 NOTE — H&P (VIEW-ONLY)
Name: Sheldon Sinclair    : 1950     Service Dept: [facility]    Chief Complaint   Patient presents with    Surgical Follow-up    Knee Pain        There were no vitals taken for this visit. Allergies   Allergen Reactions    Latex Rash    Aleve [Naproxen Sodium] Anaphylaxis    Nickel Rash    Penicillins Shortness of Breath and Rash    Ragweed Sneezing    Sulfa (Sulfonamide Antibiotics) Rash        Current Outpatient Medications   Medication Sig Dispense Refill    clindamycin (CLEOCIN) 300 mg capsule take 1 capsule by mouth every 8 hours for 3 days START AFTER SUREGRY      zinc gluconate 50 mg tablet 1 tablet      losartan (COZAAR) 100 mg tablet 1 tablet Orally Once a day      sertraline (ZOLOFT) 50 mg tablet 1 tablet Orally Once a day      simvastatin (ZOCOR) 20 mg tablet 1 tablet in the evening Orally Once a day      gabapentin (NEURONTIN) 300 mg capsule Take 1 Capsule by mouth nightly as needed for Pain. Max Daily Amount: 300 mg. Indications: neuropathic pain, acute pain following an operation 30 Capsule 1    irbesartan-hydroCHLOROthiazide (AVALIDE) 150-12.5 mg per tablet Take 1 Tablet by mouth daily. celecoxib (CELEBREX) 200 mg capsule 1 capsule with food      cholecalciferol (VITAMIN D3) (5000 Units/125 mcg) tab tablet Take 5,000 Units by mouth daily. pantoprazole (PROTONIX) 40 mg tablet 1 tablet      lutein-zeaxanthin 20 mg- 1,000 mcg cap Take 1 Capsule by mouth daily. pravastatin (PRAVACHOL) 80 mg tablet 1 Tablet daily. vit A,C,E-zinc-copper (PreserVision AREDS) cap capsule as directed.         Patient Active Problem List   Diagnosis Code    OA (osteoarthritis) of knee M17.9      Family History   Problem Relation Age of Onset    No Known Problems Mother     No Known Problems Father       Social History     Socioeconomic History    Marital status:    Tobacco Use    Smoking status: Former     Packs/day: 1.00     Years: 15.00 Pack years: 15.00     Types: Cigarettes     Quit date:      Years since quittin.0    Smokeless tobacco: Never   Vaping Use    Vaping Use: Never used   Substance and Sexual Activity    Alcohol use: Not Currently    Drug use: Never    Sexual activity: Not Currently      History reviewed. No pertinent surgical history. Past Medical History:   Diagnosis Date    High cholesterol     Hypertension     Osteoporosis         I have reviewed and agree with [facility] and ROS and intake form in chart and the record furthermore I have reviewed prior medical record(s) regarding this patients care during this appointment. Review of Systems:   Patient is a pleasant appearing individual, appropriately dressed, well hydrated, well nourished, who is alert, appropriately oriented for age, and in no acute distress with a normal gait and normal affect who does not appear to be in any significant pain. Physical Exam:  Right knee - Neurovascularly intact with good cap refill, full range of motion and full strength, well healed incision noted, no swelling, no erythema, no instability, with stiffness    Left knee - Decrease range of motion with flexion, Some crepitation, Grossly neurovascularly intact, Good cap refill, No skin lesion, Moderate swelling, No gross instability, Some quadriceps weakness   Encounter Diagnoses     ICD-10-CM ICD-9-CM   1. Left knee pain, unspecified chronicity  M25.562 719.46       HPI:  The patient is here status post left total knee replacement. Surgery was on 2022. She is status post left total knee replacement, still having some stiffness, very apprehensive on moving it. Assessment/Plan:  Plan at this point, my recommendation will be for left knee manipulation case #0 on in about a week and a half and we will go from there. We will give her 1 pain medication refill in the meantime.     As part of continued conservative pain management options the patient was advised to utilize Tylenol or OTC NSAIDS as long as it is not medically contraindicated. Return to Office: Follow-up and Dispositions    Return for schedule for surgery. Scribed by Kobi Goodwin LPN as dictated by [facility] [place] JAJA Albert MD.  Documentation, performed by, True and Accepted Thor Albert MD

## 2023-01-06 NOTE — LETTER
Gait: Norm  Limp  Cane  Crutch   Walker   Chair   Candice Barlow  :1950     Today's Date:2023   MPE:[id number]                                                 KNEE/LEG    Est  2    3    4   New  2    3   4 Consult  3           Post-op     No LOS    Injection Utrasound     47005 L       [id number]   C0421Xpjmhr    H9561Rzklkyshy   B5170Qsloncc     F/U: 1  2   3  4  6   Weeks --- PRN --  X rays next visit  -- Marley Flight   POST EMG / MRI      SUPARTZ Approval    AS      [facility]                    KNEE  LT Pain   M25.562             RT Pain         M25.561         LT OA   SOA   MOA M17.12             RT OA   SOA    MOA     M17.11          LT ACL           S83.512A             RT ACL         S83.511A   LT Internal derangement M23.92             RT Internal derangement   M23.91   LT Loose Body        M23.42                       RT Loose Body          M23.41  LT OCD        M93.262            RT OCD   M93.261   LT lateral meniscus  S83.262A            RT lateral meniscus     S83.261A   LT MCL       S83.412A                        RT MCL      S83.411A    LT medial menisus      S83.222A  RT medial menisus    S83.221A       LT prepatellar bursitiS M70.42  RT prepatellar bursitis       M70.41   LT Quad tendon rupture  M66.252 RT Quad tendon rupture   M66.251                                                      KNEE FRACTURE  Knee FX 47078     KneeCap FX 92431     Fibula FX   04636    LT FX lateral condyle    S82.125A  RT FX lateral condyle     S82.124A         LT FX medial condyle    S82.135A  RT FX medial condyle    S82.134A         LT FX tibial spine            S82.115A  RT FX tibial spine     S82.114A                LT FX transverse patella S82.035A  RT FX transverse patella S82.034A                                                               LEG FRACTURE  Tibia shaft FX   28153      LT tibia shaft  S83.202A RT Tibia shaft  S83.201A       LT Cellulitis L03. 116 RT cellulitis L03.115 Edema    R50.9   Splinting  04249- Long Leg Splint      38672- Short Leg Splint

## 2023-01-06 NOTE — PATIENT INSTRUCTIONS
Knee Pain or Injury: Care Instructions  Overview     Injuries are a common cause of knee problems. Sudden (acute) injuries may be caused by a direct blow to the knee. They can also be caused by abnormal twisting, bending, or falling on the knee. Pain, bruising, or swelling may be severe, and may start within minutes of the injury. Overuse is another cause of knee pain. Other causes are climbing stairs, kneeling, and other activities that use the knee. Everyday wear and tear, especially as you get older, also can cause knee pain. Rest, along with home treatment, often relieves pain and allows your knee to heal. If you have a serious knee injury, you may need tests and treatment. Follow-up care is a key part of your treatment and safety. Be sure to make and go to all appointments, and call your doctor if you are having problems. It's also a good idea to know your test results and keep a list of the medicines you take. How can you care for yourself at home? Be safe with medicines. Read and follow all instructions on the label. If the doctor gave you a prescription medicine for pain, take it as prescribed. If you are not taking a prescription pain medicine, ask your doctor if you can take an over-the-counter medicine. Rest and protect your knee. Take a break from any activity that may cause pain. Put ice or a cold pack on your knee for 10 to 20 minutes at a time. Put a thin cloth between the ice and your skin. Prop up a sore knee on a pillow when you ice it or anytime you sit or lie down for the next 3 days. Try to keep it above the level of your heart. This will help reduce swelling. If your knee is not swollen, you can put moist heat, a heating pad, or a warm cloth on your knee. If your doctor recommends an elastic bandage, sleeve, or other type of support for your knee, wear it as directed. Follow your doctor's instructions about how much weight you can put on your leg.  Use a cane, crutches, or a walker as instructed. Follow your doctor's instructions about activity during your healing process. If you can do mild exercise, slowly increase your activity. Stay at a healthy weight. Extra weight can strain the joints, especially the knees and hips, and make the pain worse. Losing a few pounds may help. When should you call for help? Call 911 anytime you think you may need emergency care. For example, call if:    You have symptoms of a blood clot in your lung (called a pulmonary embolism). These may include:  Sudden chest pain. Trouble breathing. Coughing up blood. Call your doctor now or seek immediate medical care if:    You have severe or increasing pain. Your leg or foot turns cold or changes color. You cannot stand or put weight on your knee. Your knee looks twisted or bent out of shape. You cannot move your knee. You have signs of infection, such as: Increased pain, swelling, warmth, or redness. Red streaks leading from the knee. Pus draining from a place on your knee. A fever. You have signs of a blood clot in your leg (called a deep vein thrombosis), such as:  Pain in your calf, back of the knee, thigh, or groin. Redness and swelling in your leg or groin. Watch closely for changes in your health, and be sure to contact your doctor if:    You have tingling, weakness, or numbness in your knee. You have any new symptoms, such as swelling. You have bruises from a knee injury that last longer than 2 weeks. You do not get better as expected. Where can you learn more? Go to http://www.gray.com/  Enter K195 in the search box to learn more about \"Knee Pain or Injury: Care Instructions. \"  Current as of: March 9, 2022               Content Version: 13.4  © 5569-4056 [employer]. Care instructions adapted under license by [employer] (which disclaims liability or warranty for this information).  If you have questions about a medical condition or this instruction, always ask your healthcare professional. [place] [address] any warranty or liability for your use of this information.

## 2023-01-06 NOTE — LETTER
1/9/2023    Patient: Lisbet Burris   YOB: 1950   Date of Visit: 1/6/2023     Mis aBiley MD  [address] [facility]  [address]  [address] [place] [address]  Via Fax: [phone]    Dear Mis Bailey MD,      Thank you for referring Ms. Helen Elena to [address] [place] [address] for evaluation. My notes for this consultation are attached. If you have questions, please do not hesitate to call me. I look forward to following your patient along with you.       Sincerely,    Wellington Lobo MD

## 2023-01-06 NOTE — LETTER
PREOP:  Eun Garcia  BNC:[birth year]     Today's Date:1/6/2023     Location of Surgery: [facility]      [facility]    HBV    Surgery Type / Date: ___________________________________________________________      [facility] #[id number] Kaylie Boyer, [address] [address]  Phone: [phone] Fax: [phone]      Pharmacy updated:  Yes or New Listed:  ______________________________________    Update Cell Phone Number & E-mail  [email or web address]__________________________________     Did you get survey ? YES or NO  if No need smart phone number ___________________________________    Do you have any history of blood clots? YES OR NO    Do you take any blood thinners?    YES OR NO  If yes List here: _________________________________    Have you ever had any complications with surgery in the past? YES OR NO    POST op Meds instructions given/ in next 24 hrs :  Yes    Instructed patient on operative time:  Yes     PT setup if needed: Yes or Not Needed    DME (daniela Kenny) info given?:  YES    PREOP at [facility]:    Already done    or     going after Here      Any Questions for Dr. Katherine Helm or _______________________________________________________________    PROVIDER SECTION  Allergies   Allergen Reactions    Latex Rash    Aleve [Naproxen Sodium] Anaphylaxis    Nickel Rash    Penicillins Shortness of Breath and Rash    Ragweed Sneezing    Sulfa (Sulfonamide Antibiotics) Rash       Pain Meds:     Percocet Tramadol   Dilaudid    Norco     NONE   Other:__________    Abx:   Keflex Bactrim Clindamycin   NONE    Always Zofran    DVT Post Op:  ASA 81/325 Eliquis   Plavix   Xarelto  Coumadin   NONE

## 2023-01-10 ENCOUNTER — HOSPITAL ENCOUNTER (OUTPATIENT)
Dept: PHYSICAL THERAPY | Age: 73
Discharge: HOME | End: 2023-01-10
Payer: MEDICARE

## 2023-01-10 PROCEDURE — 97110 THERAPEUTIC EXERCISES: CPT

## 2023-01-10 PROCEDURE — 97140 MANUAL THERAPY 1/> REGIONS: CPT

## 2023-01-10 NOTE — PROGRESS NOTES
PT DAILY TREATMENT NOTE     Patient Name: Camilla Rashid  Date:1/10/2023  : 1950  [x]  Patient  Verified  Payor: Caryn Calix / Plan: [address] / Product Type: Managed Care Medicare /    In time:4:02  Out time:4:44  Total Treatment Time (min): 42  Visit #: 8 of 10    Medicare/BCBS Only   Total Timed Codes (min):  42 1:1 Treatment Time:  42       Treatment Area: Pain in left knee [M25.562]    SUBJECTIVE  Pain Level (0-10 scale): 2  Any medication changes, allergies to medications, adverse drug reactions, diagnosis change, or new procedure performed?: [x] No    [] Yes (see summary sheet for update)  Subjective functional status/changes:   [] No changes reported  Pt reports that she will be getting a manipulation o the left knee for flexion on 23    OBJECTIVE      16 min Therapeutic Exercise:  [x] See flow sheet :   Rationale: increase ROM, increase strength, and improve coordination to improve the patients ability to tolerate increased activity    26 min Manual Therapy:  PROM with overstretch left knee Flx, STM/Effleurage/Scar massage left knee, KT I-Strip for spacing at the surgical scar     The manual therapy interventions were performed at a separate and distinct time from the therapeutic activities interventions.   Rationale: decrease pain, increase ROM, increase tissue extensibility, and decrease trigger points to improve daily activity and walking tolerance            With   [x] TE   [] TA   [] neuro   [] other: Patient Education: [x] Review HEP    [] Progressed/Changed HEP based on:   [] positioning   [] body mechanics   [] transfers   [] heat/ice application    [] other:      Other Objective/Functional Measures:   - AROM Left Knee Flx 80*  - TTP with scar adhesions at the proximal portion of the scar at the VMO region  -      Pain Level (0-10 scale) post treatment: 1    ASSESSMENT/Changes in Function:   - Increased mobility left knee upon arrival today  - Increased AROM left knee Flx at 95* after treatment  - Fair - Good tolerance with left knee mobs and effleurage  - Painful palpation of the proximal surgical scar   - Trial KT at surgical scar    Patient will continue to benefit from skilled PT services to modify and progress therapeutic interventions, address functional mobility deficits, address ROM deficits, address strength deficits, analyze and address soft tissue restrictions, and analyze and cue movement patterns to attain remaining goals. []  See Plan of Care  []  See progress note/recertification  []  See Discharge Summary         Progress towards goals / Updated goals:  Goals for this certification period to be accomplished in 10 treatments:  1. Pt to tolerate 30 min or more of TE and/or Interventions w/o increased s/s             Eval Status:  Progressing, reports discomfort with knee flexion movement              Current Status:   2. Pt will report 75% improvement or better with dysfunction to show a significant increase in ability to tolerate ADL             Eval Status:  patient reports 50% improvement              Current Status:   3. Pt will have decreased pain at 3/10 or better for carry over to doing her usual household activities with less discomfort               Eval Status: Reports 5/10 pain today               Current Status: Pain 2/10 at the start of treatment 1/10/23  4. Pt will demonstrate ambulation for 300' (I) with some discomfort for carryover to (I) ambulation between rooms in her home               Eval Status:  Patient amb without AD with antalgic gait              Current Status: Progressing at 1x300 and 1x500' with less discomfort 12/23/22  5.   Pt will demonstrate ARoM left knee Flx to 120* or better for carryover to performing sit - stand from a standard height seat with little to no difficulty               Eval Status:  Seated AROM 90* after MT              Current Status: AROM Left Knee Flx 80* progressing to 95 at end of treatment 1/10/23       PLAN  [x]  Upgrade activities as tolerated     [x]  Continue plan of care  []  Update interventions per flow sheet       []  Discharge due to:_  []  Other:_      Galo Campbell, PT 1/10/2023  6:40 PM    Future Appointments   Date Time Provider Hazel Vicente   1/13/2023  8:00 AM Yumiko Black, PT [facility]   1/16/2023 12:00 PM Kay Morales MD [facility] AM[facility]   1/17/2023 10:30 AM Yumiko Black, PT [facility]   1/18/2023  7:30 AM Panda QUINN, PT [facility]   1/19/2023  5:30 PM Yumiko Black, PT [facility]   1/20/2023 12:30 PM Gina Garcia, PTA [facility]   1/23/2023  8:30 AM Yumiko Black, PT [facility]   1/24/2023  4:00 PM Yumiko Black, PT [facility]   1/25/2023  9:00 AM Yumiko Black, PT [facility]   1/26/2023  4:30 PM Yumiko Black, PT [facility]   1/27/2023  9:00 AM Yumiko Black, PT [facility]   1/30/2023  9:00 AM RICARDA Bledsoe BS AMB   1/31/2023  1:00 PM Yumiko Black, PT [facility]   2/2/2023  1:00 PM Gina Garcia PTA [facility]   2/6/2023  9:00 AM Yumiko Black, EDGAR [facility]   2/9/2023  1:30 PM Yumiko Black PT [facility]

## 2023-01-13 ENCOUNTER — APPOINTMENT (OUTPATIENT)
Dept: PHYSICAL THERAPY | Age: 73
End: 2023-01-13
Payer: MEDICARE

## 2023-01-13 ENCOUNTER — ANESTHESIA EVENT (OUTPATIENT)
Dept: SURGERY | Age: 73
End: 2023-01-13
Payer: MEDICARE

## 2023-01-13 ENCOUNTER — HOSPITAL ENCOUNTER (OUTPATIENT)
Dept: PHYSICAL THERAPY | Age: 73
Discharge: HOME | End: 2023-01-13
Payer: MEDICARE

## 2023-01-13 PROCEDURE — 97140 MANUAL THERAPY 1/> REGIONS: CPT

## 2023-01-13 PROCEDURE — 97110 THERAPEUTIC EXERCISES: CPT

## 2023-01-13 NOTE — PROGRESS NOTES
[facility] at [address] [facility], [facility]  Phone: [phone]      Fax:  [phone]    Continued Plan of Care/ Re-certification for Physical Therapy Services    Patient name: Shannon Polk Start of Care: 2022   Referral source: Marcelle Hammond MD : 1950               Medical Diagnosis: Pain in left knee [M25.562]  Payor: [address] [place] [address],9D / Plan: [address] / Product Type: [employer] Care Medicare /  Onset Date:22               Treatment Diagnosis: Left Knee Pain   Prior Hospitalization: see medical history Provider#: [id number]   Medications: Verified on Patient summary List   Comorbidities: DM, OA,  Pacemaker, HTN, Hearing Impaired  Prior Level of Function: Frequent left knee pain  Visits from Start of Care: 16    Missed Visits: 0    The Plan of Care and following information is based on the patient's current status:  Progress towards goals / Updated goals:  Goals for this certification period to be accomplished in 10 treatments:  1. Pt to tolerate 30 min or more of TE and/or Interventions w/o increased s/s             Eval Status:  Progressing, reports discomfort with knee flexion movement              Current Status:   2. Pt will report 75% improvement or better with dysfunction to show a significant increase in ability to tolerate ADL             Eval Status:  patient reports 50% improvement              Current Status:   3. Pt will have decreased pain at 3/10 or better for carry over to doing her usual household activities with less discomfort               Eval Status: Reports 5/10 pain today               Current Status: Met, Pain 0/10 at the start of treatment 23  4.   Pt will demonstrate ambulation for 300' (I) with some discomfort for carryover to (I) ambulation between rooms in her home               Eval Status:  Patient amb without AD with antalgic gait              Current Status: Progressing at 1x300 and 1x500' with less discomfort 12/23/22  5. Pt will demonstrate ARoM left knee Flx to 120* or better for carryover to performing sit - stand from a standard height seat with little to no difficulty               Eval Status:  Seated AROM 90* after MT              Current Status: AROM Left Knee Flx 101* after stiffness is eased with exercise 1/13/23       Key functional changes: Patient has shown Fair - good progress with this treatment program. Pain as of last visit was 0/10. Patient has shown decreased pain and increased strength and mobility however, patient complains of significant stiffness that limits her ability for ambulation and general mobility. AROM Left Knee Flx 101 after knee flex exercises and overstretch. Patient reports 75% improvement with overall involvement. FOTO  score is 53. All STG/LTGs achieved as identified below. Problems/ barriers to goal attainment: Tissue congestion at the left surgical scar     Problem List: pain affecting function, decrease ROM, decrease strength, impaired gait/ balance, decrease ADL/ functional abilitiies, decrease activity tolerance, decrease flexibility/ joint mobility, and decrease transfer abilities    Treatment Plan: Therapeutic exercise, Neuromuscular reeducation, Manual therapy, Therapeutic activity, Self care/home management, Electric stim unattended , Gait training, Ultrasound, Mechanical traction, Electric stim attended, Needle insertion w/o injection (1 or 2 muscles), and Needle insertion w/o injection (3+ muscles)     Patient Goal (s) has been updated and includes: \"Getting better mobility\"     Goals for this certification period to be accomplished in 24 treatments:  1.   Pt to tolerate 30 min or more of TE and/or Interventions w/o increased s/s              Status at last note/certification:Progressing, reports discomfort with knee flexion movement   Current Status:                  2.  Pt will report 75% improvement or better with dysfunction to show a significant increase in ability to tolerate ADL              Status at last note/certification: patient reports 50% improvement   Current Status:              3.  Pt will demonstrate MMT Left Knee Flx at 5-/5 or better for good stability for progress to moderate community ambulation. Status at last note/certification: Initiated   Current Status:              4.  Pt will demonstrate ambulation for 300' (I) with some discomfort for carryover to (I) ambulation between rooms in her home                Status at last note/certification: Progressing at 1x300 and 1x500' with less discomfort   Current Status:              5.  Pt will demonstrate ARoM left knee Flx to 120* or better for carryover to performing sit - stand from a standard height seat with little to no difficulty                Status at last note/certification: AROM Left Knee Flx 101* after stiffness is eased with exercise   Current Status:                 Frequency / Duration: Patient to be seen 5 times per week for 24 treatments:    Assessment / Recommendations:Patient is showing improved function with her current treatment program but it is not sustained to this point. Patient will continue to benefit from skilled PT to address impairments and continue to improve functional mobility and tolerance to daily activity. Certification Period: 1/14/23 - 4/12/23    Katie Vásquez, PT 1/13/2023 2:14 PM    ________________________________________________________________________  I certify that the above Therapy Services are being furnished while the patient is under my care. I agree with the treatment plan and certify that this therapy is necessary. [] I have read the above and request that my patient continue as recommended.   [] I have read the above report and request that my patient continue therapy with the following changes/special instructions: ______________________________________  [] I have read the above report and request that my patient be discharged from therapy    Physician's Signature:____________Date:_________TIME:________     Trina Friedman MD  ** Signature, Date and Time must be completed for valid certification **    Please sign and return to [facility] at [address]  [place], [facility] [address] [facility] [address] [facility] [address]  Phone: [phone]      Fax:  [phone]

## 2023-01-13 NOTE — PROGRESS NOTES
PT DAILY TREATMENT NOTE     Patient Name: Marsha Mauro  Date:2023  : 1950  [x]  Patient  Verified  Payor: [employer] MEDICARE / Plan: [address] / Product Type: Managed Care Medicare /    In time:0801  Out IRNH:[id number]  Total Treatment Time (min): 41  Visit #: 9 of 10    Medicare/BCBS Only   Total Timed Codes (min):  41 1:1 Treatment Time:  38       Treatment Area: Pain in left knee [M25.562]    SUBJECTIVE  Pain Level (0-10 scale): 0  Any medication changes, allergies to medications, adverse drug reactions, diagnosis change, or new procedure performed?: [x] No    [] Yes (see summary sheet for update)  Subjective functional status/changes:   [] No changes reported  Just stiff. It woke me up last night. Reports 75% improvement    OBJECTIVE      31 min Therapeutic Exercise:  [x] See flow sheet :   Rationale: increase ROM, increase strength, and improve coordination to improve the patients ability to tolerate increased activity  10 min Manual Therapy:  Knee Flx overstretch   The manual therapy interventions were performed at a separate and distinct time from the therapeutic activities interventions.   Rationale: decrease pain, increase ROM, and increase tissue extensibility to improve left knee function          With   [x] TE   [] TA   [] neuro   [] other: Patient Education: [x] Review HEP    [] Progressed/Changed HEP based on:   [] positioning   [] body mechanics   [] transfers   [] heat/ice application    [] other:      Other Objective/Functional Measures:   - VCs for count with quad sets  - Stiffness Left knee, unable to do full rotation with bike     Pain Level (0-10 scale) post treatment: 0    ASSESSMENT/Changes in Function:   - Good exercise participation  - Improved left knee Flx to 101 after treatment    Patient will continue to benefit from skilled PT services to modify and progress therapeutic interventions, address functional mobility deficits, address ROM deficits, address strength deficits, analyze and address soft tissue restrictions, and analyze and cue movement patterns to attain remaining goals. []  See Plan of Care  []  See progress note/recertification  []  See Discharge Summary         Progress towards goals / Updated goals:  Goals for this certification period to be accomplished in 10 treatments:  1. Pt to tolerate 30 min or more of TE and/or Interventions w/o increased s/s             Eval Status:  Progressing, reports discomfort with knee flexion movement              Current Status:   2. Pt will report 75% improvement or better with dysfunction to show a significant increase in ability to tolerate ADL             Eval Status:  patient reports 50% improvement              Current Status:   3. Pt will have decreased pain at 3/10 or better for carry over to doing her usual household activities with less discomfort               Eval Status: Reports 5/10 pain today               Current Status: Met, Pain 0/10 at the start of treatment 1/13/23  4. Pt will demonstrate ambulation for 300' (I) with some discomfort for carryover to (I) ambulation between rooms in her home               Eval Status:  Patient amb without AD with antalgic gait              Current Status: Progressing at 1x300 and 1x500' with less discomfort 12/23/22  5.   Pt will demonstrate ARoM left knee Flx to 120* or better for carryover to performing sit - stand from a standard height seat with little to no difficulty               Eval Status:  Seated AROM 90* after MT              Current Status: AROM Left Knee Flx 101* after stiffness is eased with exercise 1/13/23    PLAN  [x]  Upgrade activities as tolerated     [x]  Continue plan of care  []  Update interventions per flow sheet       []  Discharge due to:_  []  Other:_      Laila Crandall, PT 1/13/2023  8:09 AM    Future Appointments   Date Time Provider Hazel Vicente   1/16/2023 12:00 PM Xavier Elam MD SOS BS AMB   1/17/2023 10:30 AM Angelina Quest, PT [facility]   1/18/2023  7:30 AM Purnima QUINN, PT [facility]   1/19/2023  5:30 PM Angelinarenay Hinds, PT [facility]   1/20/2023 12:30 PM Helen Andrade, PTA [facility]   1/23/2023  8:30 AM Angelina Quest, PT [facility]   1/24/2023  4:00 PM Angelina Quest, PT [facility]   1/25/2023  9:00 AM Angelina Quest, PT [facility]   1/26/2023  4:30 PM Angelina Quest, PT [facility]   1/27/2023  9:00 AM Angelina Quest, PT [facility]   1/30/2023  9:00 AM RICARDA Cabrera BS AMB   1/31/2023  1:00 PM Angelina Quest, PT [facility]   2/2/2023  1:00 PM Helen Andrade, PTA [facility]   2/6/2023  9:00 AM Angelina Quest, PT [facility]   2/9/2023  1:30 PM Angelina Hinds, PT [facility]

## 2023-01-16 ENCOUNTER — APPOINTMENT (OUTPATIENT)
Dept: GENERAL RADIOLOGY | Age: 73
End: 2023-01-16
Attending: ORTHOPAEDIC SURGERY
Payer: MEDICARE

## 2023-01-16 ENCOUNTER — HOSPITAL ENCOUNTER (OUTPATIENT)
Age: 73
Setting detail: OUTPATIENT SURGERY
Discharge: HOME | End: 2023-01-18
Attending: ORTHOPAEDIC SURGERY | Admitting: ORTHOPAEDIC SURGERY
Payer: MEDICARE

## 2023-01-16 ENCOUNTER — ANESTHESIA (OUTPATIENT)
Dept: SURGERY | Age: 73
End: 2023-01-16
Payer: MEDICARE

## 2023-01-16 VITALS
HEIGHT: 66 IN | HEART RATE: 60 BPM | TEMPERATURE: 98.1 F | WEIGHT: 193 LBS | DIASTOLIC BLOOD PRESSURE: 53 MMHG | BODY MASS INDEX: 31.02 KG/M2 | RESPIRATION RATE: 16 BRPM | SYSTOLIC BLOOD PRESSURE: 110 MMHG | OXYGEN SATURATION: 100 %

## 2023-01-16 PROCEDURE — 74011250636 HC RX REV CODE- 250/636: Performed by: NURSE ANESTHETIST, CERTIFIED REGISTERED

## 2023-01-16 PROCEDURE — 76010000154 HC OR TIME FIRST 0.5 HR: Performed by: ORTHOPAEDIC SURGERY

## 2023-01-16 PROCEDURE — 74011000250 HC RX REV CODE- 250: Performed by: NURSE ANESTHETIST, CERTIFIED REGISTERED

## 2023-01-16 PROCEDURE — 76060000031 HC ANESTHESIA FIRST 0.5 HR: Performed by: ORTHOPAEDIC SURGERY

## 2023-01-16 PROCEDURE — 73560 X-RAY EXAM OF KNEE 1 OR 2: CPT

## 2023-01-16 PROCEDURE — 76942 ECHO GUIDE FOR BIOPSY: CPT | Performed by: NURSE ANESTHETIST, CERTIFIED REGISTERED

## 2023-01-16 PROCEDURE — 77030013079 HC BLNKT BAIR HGGR 3M -A: Performed by: NURSE ANESTHETIST, CERTIFIED REGISTERED

## 2023-01-16 PROCEDURE — 64450 NJX AA&/STRD OTHER PN/BRANCH: CPT | Performed by: NURSE ANESTHETIST, CERTIFIED REGISTERED

## 2023-01-16 PROCEDURE — 77030003601 HC NDL NRV BLK BBMI -A: Performed by: NURSE ANESTHETIST, CERTIFIED REGISTERED

## 2023-01-16 PROCEDURE — 76210000021 HC REC RM PH II 0.5 TO 1 HR: Performed by: ORTHOPAEDIC SURGERY

## 2023-01-16 RX ORDER — SODIUM CHLORIDE, SODIUM LACTATE, POTASSIUM CHLORIDE, CALCIUM CHLORIDE 600; 310; 30; 20 MG/100ML; MG/100ML; MG/100ML; MG/100ML
25 INJECTION, SOLUTION INTRAVENOUS CONTINUOUS
Status: DISCONTINUED | OUTPATIENT
Start: 2023-01-16 | End: 2023-01-16 | Stop reason: HOSPADM

## 2023-01-16 RX ORDER — SODIUM CHLORIDE 0.9 % (FLUSH) 0.9 %
5-40 SYRINGE (ML) INJECTION AS NEEDED
Status: DISCONTINUED | OUTPATIENT
Start: 2023-01-16 | End: 2023-01-16 | Stop reason: HOSPADM

## 2023-01-16 RX ORDER — GABAPENTIN 300 MG/1
300 CAPSULE ORAL ONCE
Status: DISCONTINUED | OUTPATIENT
Start: 2023-01-16 | End: 2023-01-16 | Stop reason: HOSPADM

## 2023-01-16 RX ORDER — PROPOFOL 10 MG/ML
INJECTION, EMULSION INTRAVENOUS AS NEEDED
Status: DISCONTINUED | OUTPATIENT
Start: 2023-01-16 | End: 2023-01-16 | Stop reason: HOSPADM

## 2023-01-16 RX ORDER — OXYCODONE AND ACETAMINOPHEN 5; 325 MG/1; MG/1
1 TABLET ORAL
Status: CANCELLED | OUTPATIENT
Start: 2023-01-16

## 2023-01-16 RX ORDER — SODIUM CHLORIDE 0.9 % (FLUSH) 0.9 %
5-40 SYRINGE (ML) INJECTION EVERY 8 HOURS
Status: DISCONTINUED | OUTPATIENT
Start: 2023-01-16 | End: 2023-01-16 | Stop reason: HOSPADM

## 2023-01-16 RX ORDER — ACETAMINOPHEN 500 MG
1000 TABLET ORAL ONCE
Status: DISCONTINUED | OUTPATIENT
Start: 2023-01-16 | End: 2023-01-16 | Stop reason: HOSPADM

## 2023-01-16 RX ORDER — OXYCODONE AND ACETAMINOPHEN 5; 325 MG/1; MG/1
2 TABLET ORAL
Status: CANCELLED | OUTPATIENT
Start: 2023-01-16

## 2023-01-16 RX ORDER — MIDAZOLAM HYDROCHLORIDE 1 MG/ML
INJECTION, SOLUTION INTRAMUSCULAR; INTRAVENOUS
Status: SHIPPED | OUTPATIENT
Start: 2023-01-16 | End: 2023-01-16

## 2023-01-16 RX ORDER — DEXAMETHASONE SODIUM PHOSPHATE 4 MG/ML
INJECTION, SOLUTION INTRA-ARTICULAR; INTRALESIONAL; INTRAMUSCULAR; INTRAVENOUS; SOFT TISSUE
Status: SHIPPED | OUTPATIENT
Start: 2023-01-16 | End: 2023-01-16

## 2023-01-16 RX ORDER — BUPIVACAINE HYDROCHLORIDE 5 MG/ML
INJECTION, SOLUTION EPIDURAL; INTRACAUDAL
Status: SHIPPED | OUTPATIENT
Start: 2023-01-16 | End: 2023-01-16

## 2023-01-16 RX ADMIN — BUPIVACAINE HYDROCHLORIDE 25 ML: 5 INJECTION, SOLUTION EPIDURAL; INTRACAUDAL; PERINEURAL at 07:30

## 2023-01-16 RX ADMIN — MIDAZOLAM HYDROCHLORIDE 4 MG: 2 INJECTION, SOLUTION INTRAMUSCULAR; INTRAVENOUS at 07:30

## 2023-01-16 RX ADMIN — PROPOFOL 150 MG: 10 INJECTION, EMULSION INTRAVENOUS at 07:41

## 2023-01-16 RX ADMIN — DEXAMETHASONE SODIUM PHOSPHATE 4 MG: 4 INJECTION, SOLUTION INTRAMUSCULAR; INTRAVENOUS at 07:30

## 2023-01-16 RX ADMIN — SODIUM CHLORIDE, POTASSIUM CHLORIDE, SODIUM LACTATE AND CALCIUM CHLORIDE 25 ML/HR: 600; 310; 30; 20 INJECTION, SOLUTION INTRAVENOUS at 07:24

## 2023-01-16 NOTE — ANESTHESIA PROCEDURE NOTES
Peripheral Block    Start time: 1/16/2023 7:25 AM  End time: 1/16/2023 7:30 AM  Performed by: Nickie Osborne CRNA  Authorized by: Nickie Osborne CRNA       Pre-procedure: Indications: at surgeon's request and post-op pain management    Preanesthetic Checklist: risks and benefits discussed, site marked, timeout performed, anesthesia consent given, patient being monitored and fire risk safety assessment completed and verbalized    Timeout Time: 07:25 KANDY Napier RN)      Block Type:   Block Type:   Adductor canal block  Laterality:  Left  Monitoring:  Standard ASA monitoring, responsive to questions, oxygen, continuous pulse ox, frequent vital sign checks and heart rate  Injection Technique:  Single shot  Procedures: ultrasound guided    Patient Position: supine  Prep: chlorhexidine    Location:  Mid thigh  Needle Type:  Ultraplex  Needle Gauge:  20 G  Needle Localization:  Ultrasound guidance  Medication Injected:  Midazolam (VERSED) injection - IntraVENous   4 mg - 1/16/2023 7:30:00 AM  bupivacaine (PF) (MARCAINE) 0.5% injection - Peripheral Nerve Block   25 mL - 1/16/2023 7:30:00 AM  dexamethasone (DECADRON) 4 mg/mL injection - Peripheral Nerve Block   4 mg - 1/16/2023 7:30:00 AM  Med Admin Time: 1/16/2023 7:30 AM    Assessment:  Number of attempts:  1  Injection Assessment:  Incremental injection every 5 mL, local visualized surrounding nerve on ultrasound, negative aspiration for blood, no paresthesia, no intravascular symptoms and ultrasound image on chart  Patient tolerance:  Patient tolerated the procedure well with no immediate complications

## 2023-01-16 NOTE — OP NOTES
Operative Note    Patient: Adamaris Davidson MRN: [id number]  Surgery Date: 1/16/2023  [unfilled]          Procedure  Primary Surgeon    LEFT KNEE MANIPULATION  Henri Funes MD    * Panel 2 does not exist *  * Panel 2 does not exist *    * Panel 3 does not exist *  * Panel 3 does not exist *     Surgeon(s) and Role:     * Henri Funes MD - Primary    Other OR Staff/Assistants:  Circ-1: Beau Dupree RN  Scrub Tech-1: Anel bowser Assistant Tasks:  Closing    Pre-operative Diagnosis:  Contracture of left knee [M24.562]    Post-operative Diagnosis: same as preop diagnosis    Anesthesia Type: MAC     Findings: Mild arthrofibrosis left knee    Complications: No    EBL: 0 cc    Specimens: None    Implants:   Implants       Type Not Specified    Component Pat Cne68ml Knee Poly Dome Phillip Medialized Attune - [id number] - Implanted   (Left) Knee      Inventory item: COMPONENT PAT [id number] KNEE POLY DOME PHILLIP MEDIALIZED ATTUNE Model/Cat number: [id number]    : [employer] Lot number: [id number]    Device identifier: [id number] Device identifier type: GS1      [employer] Information       Request status Successful        Brand name: ATTUNE Version/Model: 1518-    Company name: [employer] ([place]) MRI safety info as of 11/17/22: Labeling does not contain MRI Safety Information    Contains dry or latex rubber: No      GMDN P.T. name: Polyethylene patella prosthesis                As of 11/17/2022       Status: Implanted                      Component Fem Sz 5 L Knee Omer Post Stbl Phillip Attune - [id number] - Implanted   (Left) Knee      Inventory item: COMPONENT FEM SZ 5 L KNEE OMER POST STBL PHILLIP ATTUNE Model/Cat number: [id number]    : 6[address] [employer] Lot number: [id number]    Device identifier: [id number] Device identifier type: GS1      GUDID Information       Request status Successful        Brand name: ATTUNE Version/Model: 1504-    Company name: [employer] ([place]) MRI safety info as of 11/17/22: Labeling does not contain MRI Safety Information    Contains dry or latex rubber: No      GMDN P.T. name: Uncoated knee femur prosthesis, metallic                As of 11/17/2022       Status: Implanted                      Insert Tib Sz 5 Thk5mm Knee Post Stbl Rot Platfrm Attune - [id number] - Implanted   (Left) Knee      Inventory item: INSERT TIB SZ 5 THK5MM KNEE POST STBL ROT PLATFRM ATTUNE Model/Cat number: [id number]    : [employer]_"[employer]" Lot number: [id number]    Device identifier: [id number] Device identifier type: GS1      GUDID Information       Request status Successful        Brand name: [employer] Version/Model: 1140-    Company name: [employer] [place]) MRI safety info as of 11/17/22: Labeling does not contain MRI Safety Information    Contains dry or latex rubber: No      GMDN P.T. name: Tibial insert                As of 11/17/2022       Status: Implanted                      Baseplate Tib Sz 5 Knee Rot Platfrm Phillip Sys Attune - [id number] - Implanted   (Left) Knee      Inventory item: BASEPLATE TIB SZ 5 KNEE ROT PLATFRM PHILLIP SYS ATTUNE Model/Cat number: [id number]    : [employer]_"[employer]" Lot number: [id number]    Device identifier: [id number] Device identifier type: GS1      GUDID Information       Request status Successful        Brand name: [employer] Version/Model: 1506-    Company name: Angel Muñoz ([place]) MRI safety info as of 11/17/22: Labeling does not contain MRI Safety Information    Contains dry or latex rubber: No      GMDN P.T. name: Uncoated knee tibia prosthesis, metallic                As of 11/17/2022       Status: Implanted                      Cement Bne Gentamc Hv R+G 40gm -- Palacos R+G [id number] - [id number] - Implanted   (Left) Knee      Inventory item: CEMENT BNE GENTAMICIN 40 GM HI VISC GENTAMICIN PALACOS R+G Model/Cat number: [id number]    : "[employer] Lot number: [id number]      As of 11/17/2022       Status: Implanted                      Knee K1 Tot Kenyon Std Phillip Impl Capped Synthes - [id number] - Implanted   (Left) Knee      Inventory item: KNEE K1 TOT KENYON STD PHILLIP IMPL CAPPED SYNTHES Model/Cat number: [id number]    : Constantino Murry [employer]        As of 11/17/2022       Status: Implanted                            Operative procedure: Left knee manipulation    Operative note: After a regional nerve block and sedation anesthesia is given a time was performed the left knee was manipulated under anesthesia. Patient was achieved full flexion and extension with some mild arthrofibrosis. Patella is hypermobile. There is no evidence of any pathology. Of note fluoroscopy was utilized for approximately 10 seconds for confirmation of no iatrogenic fracture post manipulation. Patient was taken to PACU in stable condition.

## 2023-01-16 NOTE — DISCHARGE INSTRUCTIONS
Lower Extremity Post-op Instructions: Your first meal home should be clear liquids    If you are given antibiotics, start antibiotics evening of the surgery unless otherwise instructed. Start Pain meds, the night you have surgery if you need is. No alcohol while on pain meds postop. An Ice bag should be applied to your operative site for at least 20 minutes four times a day. DO NOT USE HEAT-this may cause increased swelling and discomfort. You may move your toes as tolerated. You may bear weight as tolerated unless physical therapy has instructed you otherwise with the weightbearing status. Dressing should remain in place for 5 days. If you have a brace on or a splint on than those dressings and splint needs to remain in place until the follow-up appointment. No driving if you have a splint or a brace on. Swelling and discoloration may be present post-operatively. This is normal.    If your job requires little physical activity you may return as you feel able. If your job requires excessive lifting or use of affected extremity, please discuss return to work date with your nurse or physician. If you are given a virtual appointment please make sure you have a smart phone with the camera. If you need refill of pain medication, call the office 2 business days prior to running out of medication. Please call during regular business hours, Medication refill requests will not be addressed during non-business hours. Please do not page the on-call provider for pain medication refills after hours. If you have had an arthroscopic procedure you will be provided with with pictures. Please bring those pictures at the follow-up appointment. If you have a virtual appointment please have the pictures readily available during your virtual appointment.               Things to watch for:             Increased swelling of the surgical site             Spreading of redness around the incision site Drainage of pus from the incision site             Developing a fever of 101.5 °F or higher that does not respond to Tylenol               If any of these symptoms occur you have any questions please contact our office at [phone]. If you need to talk to Dr. Myrna Mann or his staff after hours please call the office and have the on-call service get in touch with the provider on call that day. Please note pain medications are not refilled after hours or on weekends. If Dr. Myrna Mann or his staff do not call you back within 30 minutes. Please tell the  to try again.

## 2023-01-16 NOTE — ANESTHESIA POSTPROCEDURE EVALUATION
Procedure(s):  LEFT KNEE MANIPULATION. regional, MAC, general - backup    Anesthesia Post Evaluation      Multimodal analgesia: multimodal analgesia used between 6 hours prior to anesthesia start to PACU discharge  Patient location during evaluation: bedside  Patient participation: complete - patient participated  Level of consciousness: awake and responsive to physical stimuli  Pain management: satisfactory to patient  Airway patency: patent  Anesthetic complications: no  Cardiovascular status: acceptable  Respiratory status: acceptable  Hydration status: acceptable  Comments: Patient is awake and comfortable post operatively. Report to RN at bedside.   Post anesthesia nausea and vomiting:  none  Final Post Anesthesia Temperature Assessment:  Normothermia (36.0-37.5 degrees C)      INITIAL Post-op Vital signs:   Vitals Value Taken Time   BP 97/55 01/16/23 0756   Temp 36.7 °C (98.1 °F) 01/16/23 0756   Pulse 67 01/16/23 0756   Resp 16 01/16/23 0756   SpO2 96 % 01/16/23 0756

## 2023-01-16 NOTE — ANESTHESIA PREPROCEDURE EVALUATION
Relevant Problems   No relevant active problems       Anesthetic History   No history of anesthetic complications            Review of Systems / Medical History  Patient summary reviewed, nursing notes reviewed and pertinent labs reviewed    Pulmonary  Within defined limits                 Neuro/Psych   Within defined limits           Cardiovascular    Hypertension              Exercise tolerance: >4 METS     GI/Hepatic/Renal  Within defined limits              Endo/Other        Arthritis     Other Findings              Physical Exam    Airway  Mallampati: II  TM Distance: 4 - 6 cm  Neck ROM: normal range of motion   Mouth opening: Normal     Cardiovascular  Regular rate and rhythm,  S1 and S2 normal,  no murmur, click, rub, or gallop  Rhythm: regular  Rate: normal         Dental  No notable dental hx       Pulmonary  Breath sounds clear to auscultation               Abdominal  GI exam deferred       Other Findings            Anesthetic Plan    ASA: 2  Anesthesia type: regional, MAC and general - backup - saphenous block      Post-op pain plan if not by surgeon: peripheral nerve block single    Induction: Intravenous  Anesthetic plan and risks discussed with: Patient

## 2023-01-16 NOTE — INTERVAL H&P NOTE
Update History & Physical    The Patient's History and Physical was reviewed with the patient. The patient was examined. There was no change. The surgical site was confirmed by the patient and me. Patient understands and wants to proceed with the procedure. If applicable, I have discussed with the patient / power of  the rationale for blood component transfusion; its benefits in treating or preventing fatigue, organ damage, or death; and its risk which includes mild transfusion reactions, rare risk of blood borne infection, or more serious but rare reactions. I have discussed the alternatives to transfusion, including the risk and consequences of not receiving transfusion. The patient / Geronimo Ani of  had an opportunity to ask questions and had agreed to proceed with transfusion of blood components. Plan:  The risk, benefits, expected outcome, and alternative to the recommended procedure have been discussed with the patient.       Electronically signed by RICARDA Gonzalez on 1/16/2023 at 7:23 AM

## 2023-01-17 ENCOUNTER — APPOINTMENT (OUTPATIENT)
Dept: PHYSICAL THERAPY | Age: 73
End: 2023-01-17
Payer: MEDICARE

## 2023-01-17 ENCOUNTER — HOSPITAL ENCOUNTER (OUTPATIENT)
Dept: PHYSICAL THERAPY | Age: 73
Discharge: HOME | End: 2023-01-17
Payer: MEDICARE

## 2023-01-17 PROCEDURE — 97140 MANUAL THERAPY 1/> REGIONS: CPT

## 2023-01-17 PROCEDURE — 97110 THERAPEUTIC EXERCISES: CPT

## 2023-01-17 NOTE — PROGRESS NOTES
PT DAILY TREATMENT NOTE     Patient Name: Marsha Mauro  Date:2023  : 1950  [x]  Patient  Verified  Payor: [employer] MEDICARE / Plan: [address] / Product Type: Managed Care Medicare /    In time:1036  Out time:1130  Total Treatment Time (min): 47  Visit #: 17 of 40    Medicare/BCBS Only   Total Timed Codes (min):  54 1:1 Treatment Time:  54       Treatment Area: Pain in left knee [M25.562]    SUBJECTIVE  Pain Level (0-10 scale): 0  Any medication changes, allergies to medications, adverse drug reactions, diagnosis change, or new procedure performed?: [x] No    [] Yes (see summary sheet for update)  Subjective functional status/changes:   [] No changes reported  Patient received a left knee manipulation yesterday. MD jeong PT daily for 2 weeks. OBJECTIVE      44 min Therapeutic Exercise:  [x] See flow sheet :   Rationale: increase ROM, increase strength, and improve coordination to improve the patients ability to tolerate increased activity tolerance      10 min Manual Therapy:  Left knee Flx with overstretch   The manual therapy interventions were performed at a separate and distinct time from the therapeutic activities interventions.   Rationale: increase ROM and increase tissue extensibility to tolerate increased daily activity          With   [x] TE   [] TA   [] neuro   [] other: Patient Education: [x] Review HEP    [] Progressed/Changed HEP based on:   [] positioning   [] body mechanics   [] transfers   [] heat/ice application    [] other:      Other Objective/Functional Measures:   - AROM Left Knee Flx 92  - MMT left knee Flx 4 Ext 5     Pain Level (0-10 scale) post treatment: 0    ASSESSMENT/Changes in Function:   - Good tolerance with treatment program on the day after her left knee manipulation  - Increased ease of motion with continued tightness at a lesser degree  - Increased AROM after treatment to 108*    Patient will continue to benefit from skilled PT services to modify and progress therapeutic interventions, address functional mobility deficits, address ROM deficits, address strength deficits, analyze and address soft tissue restrictions, and analyze and cue movement patterns to attain remaining goals. []  See Plan of Care  []  See progress note/recertification  []  See Discharge Summary         Progress towards goals / Updated goals:  Goals for this certification period to be accomplished in 24 treatments:  1. Pt to tolerate 30 min or more of TE and/or Interventions w/o increased s/s                        Status at last note/certification:Progressing, reports discomfort with knee flexion movement             Current Status:                  2.  Pt will report 75% improvement or better with dysfunction to show a significant increase in ability to tolerate ADL                        Status at last note/certification: patient reports 50% improvement             Current Status:              3.  Pt will demonstrate MMT Left Knee Flx at 5-/5 or better for good stability for progress to moderate community ambulation. Status at last note/certification: Initiated             Current Status: MMY Left Knee Flx 4 with pain 1/17/23              4.  Pt will demonstrate ambulation for 300' (I) with some discomfort for carryover to (I) ambulation between rooms in her home                          Status at last note/certification: Progressing at 1x300 and 1x500' with less discomfort             Current Status:  Progressing at 80' with improved knee flx 1/17/23            5.   Pt will demonstrate ARoM left knee Flx to 120* or better for carryover to performing sit - stand from a standard height seat with little to no difficulty                          Status at last note/certification: AROM Left Knee Flx 101* after stiffness is eased with exercise             Current Status:  AROM 92* upon arrival, progressing to 108 during treatment 1/17/23    PLAN  [x]  Upgrade activities as tolerated     [x]  Continue plan of care  []  Update interventions per flow sheet       []  Discharge due to:_  []  Other:_      Inna Covert, PT 1/17/2023  3:04 PM    Future Appointments   Date Time Provider Hazel Vicente   1/18/2023  7:30 AM Pasha Cordova, PT [address]   1/19/2023  5:30 PM Pasha Cordova, PT [address]   1/20/2023 12:30 PM Tera Retana, PTA [address]   1/23/2023  8:30 AM Pasha Cordova, PT [address]   1/24/2023  4:00 PM Pasha Cordova, PT [address]   1/25/2023  9:00 AM Pasha Cordova, PT [address]   1/26/2023  4:30 PM Pasha Cordova, PT [address]   1/27/2023  9:00 AM Pasha Cordova, PT [address]   1/30/2023 10:15 AM RICARDA Srivastava BS AMB   1/31/2023  1:00 PM Pasha Cordova, PT [address]   2/2/2023  1:00 PM Tera Retana, PTA [address]   2/6/2023  9:00 AM Pasha Cordova, PT [address]   2/9/2023  1:30 PM Pasha Cordova, PT [address]

## 2023-01-18 ENCOUNTER — APPOINTMENT (OUTPATIENT)
Dept: PHYSICAL THERAPY | Age: 73
End: 2023-01-18
Payer: MEDICARE

## 2023-01-18 ENCOUNTER — HOSPITAL ENCOUNTER (OUTPATIENT)
Dept: PHYSICAL THERAPY | Age: 73
Discharge: HOME | End: 2023-01-18
Payer: MEDICARE

## 2023-01-18 PROCEDURE — 97140 MANUAL THERAPY 1/> REGIONS: CPT

## 2023-01-18 PROCEDURE — 97110 THERAPEUTIC EXERCISES: CPT

## 2023-01-18 NOTE — PROGRESS NOTES
PT DAILY TREATMENT NOTE     Patient Name: Marsha Mauro  Date:2023  : 1950  [x]  Patient  Verified  Payor: [employer] / Plan: [employer] / Product Type: Managed Care Medicare /    In [id number]  Out [id number]  Total Treatment Time (min): 43  Visit #: 18 of 40    Medicare/BCBS Only   Total Timed Codes (min):  43 1:1 Treatment Time:  43       Treatment Area: Pain in left knee [M25.562]    SUBJECTIVE  Pain Level (0-10 scale):  2  Any medication changes, allergies to medications, adverse drug reactions, diagnosis change, or new procedure performed?: [x] No    [] Yes (see summary sheet for update)  Subjective functional status/changes:   [] No changes reported  Anisthesia wore off     OBJECTIVE    Modality rationale: decrease inflammation and decrease pain to improve the patients ability to tolerate post treatment soreness   Min Type Additional Details    [] Estim:  []Unatt       []IFC  []Premod                        []Other:  []w/ice   []w/heat  Position:  Location:    [] Estim: []Att    []TENS instruct  []NMES                    []Other:  []w/US   []w/ice   []w/heat  Position:  Location:    []  Traction: [] Cervical       []Lumbar                       [] Prone          []Supine                       []Intermittent   []Continuous Lbs:  [] before manual  [] after manual    []  Ultrasound: []Continuous   [] Pulsed                           []1MHz   []3MHz W/cm2:  Location:    []  Iontophoresis with dexamethasone         Location: [] Take home patch   [] In clinic   5 [x]  Ice     []  heat  []  Ice massage  []  Laser   []  Anodyne Position: supine  Location: Left knee    []  Laser with stim  []  Other:  Position:  Location:    []  Vasopneumatic Device    []  Right     []  Left  Pre-treatment girth:  Post-treatment girth:  Measured at (location):  Pressure:       [] lo [] med [] hi   Temperature: [] lo [] med [] hi   [x] Skin assessment post-treatment: [x]intact []redness- no adverse reaction    []redness - adverse reaction:       12 min Therapeutic Exercise:  [x] See flow sheet :   Rationale: increase ROM, increase strength, and improve coordination to improve the patients ability to improve activity tolerance    4 min Therapeutic Activity:  [x]  See flow sheet :   Rationale: increase ROM, increase strength, improve coordination, improve balance, and increase proprioception  to improve the patients ability to improve walking tolerance     22 min Manual Therapy:  Knee Flx Mobs, Knee Flx PROM with Overstretch   The manual therapy interventions were performed at a separate and distinct time from the therapeutic activities interventions. Rationale: decrease pain, increase ROM, and increase tissue extensibility to improve functional mobility with daily activity            With   [x] TE   [] TA   [] neuro   [] other: Patient Education: [x] Review HEP    [] Progressed/Changed HEP based on:   [] positioning   [] body mechanics   [] transfers   [] heat/ice application    [] other:      Other Objective/Functional Measures:   - Knee Flx 95 to tolerance     Pain Level (0-10 scale) post treatment: 2    ASSESSMENT/Changes in Function:   - Improved AROM after treatment of MT to 115*  - Pt showing improved left knee Flx with ambulation  -     Patient will continue to benefit from skilled PT services to modify and progress therapeutic interventions, address functional mobility deficits, address ROM deficits, address strength deficits, analyze and address soft tissue restrictions, and analyze and cue movement patterns to attain remaining goals. []  See Plan of Care  []  See progress note/recertification  []  See Discharge Summary         Progress towards goals / Updated goals:  Goals for this certification period to be accomplished in 24 treatments:  1.   Pt to tolerate 30 min or more of TE and/or Interventions w/o increased s/s                        Status at last note/certification:Progressing, reports discomfort with knee flexion movement             Current Status:                  2.  Pt will report 75% improvement or better with dysfunction to show a significant increase in ability to tolerate ADL                        Status at last note/certification: patient reports 50% improvement             Current Status:              3.  Pt will demonstrate MMT Left Knee Flx at 5-/5 or better for good stability for progress to moderate community ambulation. Status at last note/certification: Initiated             Current Status: MMY Left Knee Flx 4 with pain 1/17/23              4.  Pt will demonstrate ambulation for 300' (I) with some discomfort for carryover to (I) ambulation between rooms in her home                          Status at last note/certification: Progressing at 1x300 and 1x500' with less discomfort             Current Status:  Progressing at 0' with improved knee flx 1/18/23            5.   Pt will demonstrate ARoM left knee Flx to 120* or better for carryover to performing sit - stand from a standard height seat with little to no difficulty                          Status at last note/certification: AROM Left Knee Flx 101* after stiffness is eased with exercise             Current Status:  AROM 95* upon arrival, progressing to 115 during treatment 1/18/23       PLAN  [x]  Upgrade activities as tolerated     [x]  Continue plan of care  []  Update interventions per flow sheet       []  Discharge due to:_  []  Other:_      Marycruz Gustafson PT 1/18/2023  7:40 AM    Future Appointments   Date Time Provider Hazel Vicente   1/19/2023  5:30 PM Delma Garcia, PT [facility]   1/20/2023 12:30 PM Leopold Speck, PTA [facility]   1/23/2023  8:30 AM Delma Garcia PT [facility]   1/24/2023  4:00 PM Delma Garcia, PT [facility]   1/25/2023  9:00 AM Delma Garcia, PT [facility]   1/26/2023  4:30 PM Delma Garcia, PT [facility] 1/27/2023  9:00 AM Petacasanjana Silva, PT [facility]   1/30/2023 10:15 AM RICARDA Leon BS AMB   1/31/2023  1:00 PM Petaca Honour, PT [facility]   2/2/2023  1:00 PM Archana Berkowitz, PTA [facility]   2/6/2023  9:00 AM Petaca Shira, PT [facility]   2/9/2023  1:30 PM Petaca Shira, PT [facility]

## 2023-01-19 ENCOUNTER — APPOINTMENT (OUTPATIENT)
Dept: PHYSICAL THERAPY | Age: 73
End: 2023-01-19
Payer: MEDICARE

## 2023-01-19 ENCOUNTER — HOSPITAL ENCOUNTER (OUTPATIENT)
Dept: PHYSICAL THERAPY | Age: 73
Discharge: HOME | End: 2023-01-19
Payer: MEDICARE

## 2023-01-19 PROCEDURE — 97110 THERAPEUTIC EXERCISES: CPT

## 2023-01-19 PROCEDURE — 97140 MANUAL THERAPY 1/> REGIONS: CPT

## 2023-01-19 PROCEDURE — 97530 THERAPEUTIC ACTIVITIES: CPT

## 2023-01-19 NOTE — PROGRESS NOTES
PT DAILY TREATMENT NOTE     Patient Name: Ann Zhu  Date:2023  : 1950  [x]  Patient  Verified  Payor: [facility] MEDICARE / Plan: [address] [address] / Product Type: Managed Care Medicare /    In time:5:31  Out time:6:15  Total Treatment Time (min): 44  Visit #: 19 of 40    Medicare/BCBS Only   Total Timed Codes (min):  44 1:1 Treatment Time:  44       Treatment Area: Pain in left knee [M25.562]    SUBJECTIVE  Pain Level (0-10 scale): 2-3  Any medication changes, allergies to medications, adverse drug reactions, diagnosis change, or new procedure performed?: [x] No    [] Yes (see summary sheet for update)  Subjective functional status/changes:   [] No changes reported  Doing exercise 4 times a day    OBJECTIVE      18 min Therapeutic Exercise:  [] See flow sheet :   Rationale: increase ROM, increase strength, and improve coordination to improve the patients ability to improve ability for more activity    8 min Therapeutic Activity:  [x]  See flow sheet :   Rationale: increase strength and improve coordination  to improve the patients ability to improve function     18 min Manual Therapy:  Knee Flx Mobs, Knee Flx PROM with overstretch, KT I-Strip for spacing at the proximal surgical scar     The manual therapy interventions were performed at a separate and distinct time from the therapeutic activities interventions.   Rationale: decrease pain, increase ROM, increase tissue extensibility, and decrease trigger points to improve activity tolerance          With   [x] TE   [] TA   [] neuro   [] other: Patient Education: [x] Review HEP    [] Progressed/Changed HEP based on:   [] positioning   [] body mechanics   [] transfers   [] heat/ice application    [] other:      Other Objective/Functional Measures:   - AROM Right Knee Flx 108   - Increased Bruising at the left medial knee with swelling     Pain Level (0-10 scale) post treatment: 2    ASSESSMENT/Changes in Function:   - Good response to treatment with improved AROM to 116* after treatment  - Good response to treatment with improved gait with increased knee flexion during swing phase of gait      Patient will continue to benefit from skilled PT services to modify and progress therapeutic interventions, address functional mobility deficits, address ROM deficits, address strength deficits, analyze and address soft tissue restrictions, and analyze and cue movement patterns to attain remaining goals. []  See Plan of Care  []  See progress note/recertification  []  See Discharge Summary         Progress towards goals / Updated goals:  Goals for this certification period to be accomplished in 24 treatments:  1. Pt to tolerate 30 min or more of TE and/or Interventions w/o increased s/s                        Status at last note/certification:Progressing, reports discomfort with knee flexion movement             Current Status:                  2.  Pt will report 75% improvement or better with dysfunction to show a significant increase in ability to tolerate ADL                        Status at last note/certification: patient reports 50% improvement             Current Status:              3.  Pt will demonstrate MMT Left Knee Flx at 5-/5 or better for good stability for progress to moderate community ambulation. Status at last note/certification: Initiated             Current Status: MMY Left Knee Flx 4 with pain 1/17/23              4.  Pt will demonstrate ambulation for 300' (I) with some discomfort for carryover to (I) ambulation between rooms in her home                          Status at last note/certification: Progressing at 1x300 and 1x500' with less discomfort             Current Status:  Progressing at 0' with improved knee flx 1/19/23            5.   Pt will demonstrate AROM left knee Flx to 120* or better for carryover to performing sit - stand from a standard height seat with little to no difficulty                          Status at last note/certification: AROM Left Knee Flx 101* after stiffness is eased with exercise             Current Status:  AROM 108* upon arrival, progressing to 116* during treatment 1/19/23       PLAN  [x]  Upgrade activities as tolerated     [x]  Continue plan of care  []  Update interventions per flow sheet       []  Discharge due to:_  []  Other:_      Riana Bridegroom, PT 1/19/2023  5:56 PM    Future Appointments   Date Time Provider Hazel Vicente   1/20/2023 10:30 AM Rejeana Bottoms, PT [facility]   1/23/2023  8:30 AM Rejeana Bottoms, PT [facility]   1/24/2023  4:00 PM Rejeana Bottoms, PT [facility]   1/25/2023  9:00 AM Rejeana Bottoms, PT [facility]   1/26/2023  4:30 PM Rejeana Bottoms, PT [facility]   1/27/2023  9:00 AM Rejeana Bottoms, PT [facility]   1/31/2023  1:00 PM Rejeana Bottoms, PT [facility]   2/1/2023  9:30 AM RICARDA Way BS AMB   2/2/2023  1:00 PM Elieser Vences, MICHELINE [facility]   2/6/2023  9:00 AM Rejeana Bottoms, PT [facility]   2/9/2023  1:30 PM Rejeana Bottoms, PT [place] [facility]

## 2023-01-20 ENCOUNTER — HOSPITAL ENCOUNTER (OUTPATIENT)
Dept: PHYSICAL THERAPY | Age: 73
Discharge: HOME | End: 2023-01-20
Payer: MEDICARE

## 2023-01-20 ENCOUNTER — APPOINTMENT (OUTPATIENT)
Dept: PHYSICAL THERAPY | Age: 73
End: 2023-01-20
Payer: MEDICARE

## 2023-01-20 PROCEDURE — 97530 THERAPEUTIC ACTIVITIES: CPT

## 2023-01-20 PROCEDURE — 97140 MANUAL THERAPY 1/> REGIONS: CPT

## 2023-01-20 PROCEDURE — 97110 THERAPEUTIC EXERCISES: CPT

## 2023-01-20 NOTE — PROGRESS NOTES
PT DAILY TREATMENT NOTE     Patient Name: Ward Job  Date:2023  : 1950  [x]  Patient  Verified  Payor: [facility] MEDICARE / Plan: [address] / Product Type: Managed Care Medicare /    In time:1033  Out time:1130  Total Treatment Time (min): 62  Visit #: 20 of 40    Medicare/BCBS Only   Total Timed Codes (min):  57 1:1 Treatment Time:  62       Treatment Area: Pain in left knee [M25.562]    SUBJECTIVE  Pain Level (0-10 scale): 3  Any medication changes, allergies to medications, adverse drug reactions, diagnosis change, or new procedure performed?: [x] No    [] Yes (see summary sheet for update)  Subjective functional status/changes:   [] No changes reported  Just feels real stiff and swelling    OBJECTIVE    20 min Therapeutic Exercise:  [x] See flow sheet :   Rationale: increase ROM, increase strength, and improve coordination to improve the patients ability to improve ability for more activity     12 min Therapeutic Activity:  [x]  See flow sheet :   Rationale: increase strength and improve coordination  to improve the patients ability to improve function     25 min Manual Therapy:  Knee Flx PROM with overstretch, KT I-Strip for spacing at the proximal surgical scar, Effleurage left knee and distal quad    The manual therapy interventions were performed at a separate and distinct time from the therapeutic activities interventions.   Rationale: decrease pain, increase ROM, increase tissue extensibility, and decrease trigger points to improve activity tolerance                                                                         With   [x] TE   [x] TA   [] neuro   [] other: Patient Education: [x] Review HEP    [] Progressed/Changed HEP based on:   [] positioning   [] body mechanics   [] transfers   [] heat/ice application    [] other:      Other Objective/Functional Measures:   - Stiffness in the right knee upon arrival today  - AROM Left Knee Flx 98*  - Decreased ease of knee flexion with exercises today  - Pt showing frustration with the stiffness and reports being tired with the exercises  - Pt reports a numb feeling at the distal lateral quad that is irritating to touch. No pain with touching just a \"weird\" feeling     Pain Level (0-10 scale) post treatment: 2    ASSESSMENT/Changes in Function:   - Good response to KT with decreased swelling and TTP at the left knee  - Improved left knee Flx to 110* after treatment  - Pt reports too tired to walk after treatment today, '    Patient will continue to benefit from skilled PT services to modify and progress therapeutic interventions, address functional mobility deficits, address ROM deficits, address strength deficits, analyze and address soft tissue restrictions, and analyze and cue movement patterns to attain remaining goals. []  See Plan of Care  []  See progress note/recertification  []  See Discharge Summary         Progress towards goals / Updated goals:  Goals for this certification period to be accomplished in 24 treatments:  1. Pt to tolerate 30 min or more of TE and/or Interventions w/o increased s/s                        Status at last note/certification:Progressing, reports discomfort with knee flexion movement             Current Status:  Met with no increeased s/s 1/20/23                2.  Pt will report 75% improvement or better with dysfunction to show a significant increase in ability to tolerate ADL                        Status at last note/certification: patient reports 50% improvement             Current Status:              3.  Pt will demonstrate MMT Left Knee Flx at 5-/5 or better for good stability for progress to moderate community ambulation.                           Status at last note/certification: Initiated             Current Status: MMY Left Knee Flx 4 with pain 1/17/23              4.  Pt will demonstrate ambulation for 300' (I) with some discomfort for carryover to (I) ambulation between rooms in her home                          Status at last note/certification: Progressing at 1x300 and 1x500' with less discomfort             Current Status:  Progressing at 0' with improved knee flx 1/19/23            5.   Pt will demonstrate AROM left knee Flx to 120* or better for carryover to performing sit - stand from a standard height seat with little to no difficulty                          Status at last note/certification: AROM Left Knee Flx 101* after stiffness is eased with exercise             Current Status:  AROM 108* upon arrival, progressing to 116* during treatment 1/19/23    PLAN  [x]  Upgrade activities as tolerated     [x]  Continue plan of care  []  Update interventions per flow sheet       []  Discharge due to:_  []  Other:_      Mariela Noble, PT 1/20/2023  10:37 AM    Future Appointments   Date Time Provider Hazel Vicente   1/23/2023  8:30 AM Kamron Katz, PT [facility]   1/24/2023  4:00 PM Kamron Katz, PT [facility]   1/25/2023  9:00 AM Kamron Katz, PT [facility]26/2023  4:30 PM aKmron Katz, PT [facility]   1/27/2023  9:00 AM Kamron Katz, PT [facility]   1/31/2023  1:00 PM Kamron Katz, PT [facility]   2/1/2023  9:30 AM RICARDA Freire BS AMB   2/2/2023  1:00 PM Fang Bennett, PTA [facility]   2/6/2023  9:00 AM [facility]   2/9/2023  1:30 PM Kamron Katz, PT [facility]

## 2023-01-23 ENCOUNTER — APPOINTMENT (OUTPATIENT)
Dept: PHYSICAL THERAPY | Age: 73
End: 2023-01-23
Payer: MEDICARE

## 2023-01-23 ENCOUNTER — HOSPITAL ENCOUNTER (OUTPATIENT)
Dept: PHYSICAL THERAPY | Age: 73
Discharge: HOME | End: 2023-01-23
Payer: MEDICARE

## 2023-01-23 PROCEDURE — 97530 THERAPEUTIC ACTIVITIES: CPT

## 2023-01-23 PROCEDURE — 97110 THERAPEUTIC EXERCISES: CPT

## 2023-01-23 PROCEDURE — 97140 MANUAL THERAPY 1/> REGIONS: CPT

## 2023-01-23 NOTE — PROGRESS NOTES
PT DAILY TREATMENT NOTE     Patient Name: Marc Ward  Date:2023  : 1950  [x]  Patient  Verified  Payor: Dimitris Plana / Plan: [address] / Product Type: Managed Care Medicare /    In WNCM:2322  Out SACF:2518  Total Treatment Time (min): 45  Visit #: 21 of 40    Medicare/BCBS Only   Total Timed Codes (min):  45 1:1 Treatment Time:  40       Treatment Area: Pain in left knee [M25.562]    SUBJECTIVE  Pain Level (0-10 scale): 2  Any medication changes, allergies to medications, adverse drug reactions, diagnosis change, or new procedure performed?: [x] No    [] Yes (see summary sheet for update)  Subjective functional status/changes:   [] No changes reported  Just stiff    OBJECTIVE      18 min Therapeutic Exercise:  [x] See flow sheet :   Rationale: increase ROM, increase strength, and improve coordination to improve the patients ability to improve functional tolerance    12 min Therapeutic Activity:  [x]  See flow sheet :   Rationale: increase ROM, increase strength, and improve coordination  to improve the patients ability to perform increased activity     15 min Manual Therapy:  PROM Left Knee Flx overstretch, Left Knee Mobs, sensation rub, KT I-Strip for spacing at the distal quad full strength      The manual therapy interventions were performed at a separate and distinct time from the therapeutic activities interventions.   Rationale: decrease pain, increase ROM, and increase tissue extensibility to improve function            With   [x] TE   [] TA   [] neuro   [] other: Patient Education: [x] Review HEP    [] Progressed/Changed HEP based on:   [] positioning   [] body mechanics   [] transfers   [] heat/ice application    [] other:      Other Objective/Functional Measures:   - AROM Left Knee Flx 108* after exercise     Pain Level (0-10 scale) post treatment: 2    ASSESSMENT/Changes in Function:   - Increased knee Flx to 114* after MT    Patient will continue to benefit from skilled PT services to modify and progress therapeutic interventions, address functional mobility deficits, address ROM deficits, address strength deficits, analyze and address soft tissue restrictions, and analyze and cue movement patterns to attain remaining goals. []  See Plan of Care  []  See progress note/recertification  []  See Discharge Summary         Progress towards goals / Updated goals:  Goals for this certification period to be accomplished in 24 treatments:  1. Pt to tolerate 30 min or more of TE and/or Interventions w/o increased s/s                        Status at last note/certification:Progressing, reports discomfort with knee flexion movement             Current Status:  Met with no increeased s/s 1/20/23                2.  Pt will report 75% improvement or better with dysfunction to show a significant increase in ability to tolerate ADL                        Status at last note/certification: patient reports 50% improvement             Current Status:              3.  Pt will demonstrate MMT Left Knee Flx at 5-/5 or better for good stability for progress to moderate community ambulation. Status at last note/certification: Initiated             Current Status: MMY Left Knee Flx 4 with pain 1/17/23              4.  Pt will demonstrate ambulation for 300' (I) with some discomfort for carryover to (I) ambulation between rooms in her home                          Status at last note/certification: Progressing at 1x300 and 1x500' with less discomfort             Current Status:  Progressing at 0' with improved knee flx 1/19/23            5.   Pt will demonstrate AROM left knee Flx to 120* or better for carryover to performing sit - stand from a standard height seat with little to no difficulty                          Status at last note/certification: AROM Left Knee Flx 101* after stiffness is eased with exercise Current Status:  AROM 108* upon arrival, progressing to 116* during treatment 1/19/23    PLAN  [x]  Upgrade activities as tolerated     [x]  Continue plan of care  []  Update interventions per flow sheet       []  Discharge due to:_  []  Other:_      Kamiah Part, PT 1/23/2023  9:03 AM    Future Appointments   Date Time Provider Hazel Vicente   1/24/2023  4:00 PM Maxine Hartman, [place] [facility]   1/25/2023  9:00 AM Maxine Hartman, PT [facility]   1/26/2023  4:30 PM Maxine Hartman, PT [facility]   1/27/2023  9:00 AM Maxine Hartman, PT [facility]   1/31/2023  1:00 PM Maxine Hartman, PT [facility]   2/1/2023  9:30 AM RICARDA Lopez BS AMB   2/2/2023  1:00 PM Tawanna Singleton, PTA [facility]   2/6/2023  9:00 AM Maxine Hartman, PT [facility]   2/9/2023  1:30 PM Maxine Hartman, PT [facility]

## 2023-01-24 ENCOUNTER — HOSPITAL ENCOUNTER (OUTPATIENT)
Dept: PHYSICAL THERAPY | Age: 73
Discharge: HOME | End: 2023-01-24
Payer: MEDICARE

## 2023-01-24 ENCOUNTER — APPOINTMENT (OUTPATIENT)
Dept: PHYSICAL THERAPY | Age: 73
End: 2023-01-24
Payer: MEDICARE

## 2023-01-24 PROCEDURE — 97530 THERAPEUTIC ACTIVITIES: CPT

## 2023-01-24 PROCEDURE — 97140 MANUAL THERAPY 1/> REGIONS: CPT

## 2023-01-24 PROCEDURE — 97110 THERAPEUTIC EXERCISES: CPT

## 2023-01-24 NOTE — PROGRESS NOTES
PT DAILY TREATMENT NOTE     Patient Name: Eun Garcia  Date:2023  : 1950  [x]  Patient  Verified  Payor: [facility] MEDICARE / Plan: [address] / Product Type: Managed Care Medicare /    In time:3:55  Out time:4:58  Total Treatment Time (min): 48  Visit #: 22 of 40    Medicare/BCBS Only   Total Timed Codes (min):  53 1:1 Treatment Time:  53       Treatment Area: Pain in left knee [M25.562]    SUBJECTIVE  Pain Level (0-10 scale): 1  Any medication changes, allergies to medications, adverse drug reactions, diagnosis change, or new procedure performed?: [x] No    [] Yes (see summary sheet for update)  Subjective functional status/changes:   [] No changes reported  Doing better today    OBJECTIVE         28 min Therapeutic Exercise:  [x] See flow sheet :   Rationale: increase ROM, increase strength, and improve coordination to improve the patients ability to improve functional tolerance     13 min Therapeutic Activity:  [x]  See flow sheet :   Rationale: increase ROM, increase strength, and improve coordination  to improve the patients ability to perform increased activity     12 min Manual Therapy:  PROM Left Knee Flx overstretch, Left Knee Mobs, sensation rub, KT I-Strip for spacing at the distal quad full strength      The manual therapy interventions were performed at a separate and distinct time from the therapeutic activities interventions.   Rationale: decrease pain, increase ROM, and increase tissue extensibility to improve function          With   [x] TE   [x] TA   [] neuro   [] other: Patient Education: [x] Review HEP    [] Progressed/Changed HEP based on:   [] positioning   [] body mechanics   [] transfers   [] heat/ice application    [] other:      Other Objective/Functional Measures:   - Pt arrived to treatment session with an improved gait pattern and increased ease of mobility     Pain Level (0-10 scale) post treatment: 0    ASSESSMENT/Changes in Function:   - Good response to treatment with improved AROM Left Knee Flx to 121* after treatment  - Pt demonstrated improved knee flx with ambulation after treatment    Patient will continue to benefit from skilled PT services to modify and progress therapeutic interventions, address functional mobility deficits, address ROM deficits, address strength deficits, analyze and address soft tissue restrictions, and analyze and cue movement patterns to attain remaining goals. []  See Plan of Care  []  See progress note/recertification  []  See Discharge Summary         Progress towards goals / Updated goals:  Goals for this certification period to be accomplished in 24 treatments:  1. Pt to tolerate 30 min or more of TE and/or Interventions w/o increased s/s                        Status at last note/certification:Progressing, reports discomfort with knee flexion movement             Current Status:  Met with no increeased s/s 1/20/23                2.  Pt will report 75% improvement or better with dysfunction to show a significant increase in ability to tolerate ADL                        Status at last note/certification: patient reports 50% improvement             Current Status:              3.  Pt will demonstrate MMT Left Knee Flx at 5-/5 or better for good stability for progress to moderate community ambulation. Status at last note/certification: Initiated             Current Status: MMT Left Knee Flx 4 with pain 1/17/23              4.  Pt will demonstrate ambulation for 300' (I) with some discomfort for carryover to (I) ambulation between rooms in her home                          Status at last note/certification: Progressing at 1x300 and 1x500' with less discomfort             Current Status:  Progressing at 0' with improved knee flx 1/19/23            5.   Pt will demonstrate AROM left knee Flx to 120* or better for carryover to performing sit - stand from a standard height seat with little to no difficulty                          Status at last note/certification: AROM Left Knee Flx 101* after stiffness is eased with exercise             Current Status:  Progressing at AROM 121 after treatment today 1/24/23    PLAN  [x]  Upgrade activities as tolerated     [x]  Continue plan of care  []  Update interventions per flow sheet       []  Discharge due to:_  []  Other:_      Kj Yumi, PT 1/24/2023  4:53 PM    Future Appointments   Date Time Provider Hazel Vicente   1/25/2023  9:00 AM Ruby Found, PT [facility]   1/26/2023  4:30 PM Ruby Found, PT [facility]   1/27/2023  9:00 AM Ruby Found, PT [facility]   1/31/2023  1:00 PM Ruby Found, PT [facility]   2/1/2023  9:30 AM RICARDA Umanzor BS AMB   2/2/2023  1:00 PM Jarvis Carreon, PTA [facility]   2/6/2023  9:00 AM Ruby Found, PT [facility]   2/9/2023  1:30 PM Ruby Found, PT [facility]

## 2023-01-25 ENCOUNTER — HOSPITAL ENCOUNTER (OUTPATIENT)
Dept: PHYSICAL THERAPY | Age: 73
Discharge: HOME | End: 2023-01-25
Payer: MEDICARE

## 2023-01-25 PROCEDURE — 97530 THERAPEUTIC ACTIVITIES: CPT

## 2023-01-25 PROCEDURE — 97110 THERAPEUTIC EXERCISES: CPT

## 2023-01-25 PROCEDURE — 97140 MANUAL THERAPY 1/> REGIONS: CPT

## 2023-01-25 NOTE — PROGRESS NOTES
PT DAILY TREATMENT NOTE     Patient Name: Gareth Gonzalez  Date:2023  : 1950  [x]  Patient  Verified  Payor: [employer] MEDICARE / Plan: [address] / Product Type: Managed Care Medicare /    In time:904  Out time:  Total Treatment Time (min): 47  Visit #: 23 of 40    Medicare/BCBS Only   Total Timed Codes (min):  54 1:1 Treatment Time:  54       Treatment Area: Pain in left knee [M25.562]    SUBJECTIVE  Pain Level (0-10 scale): 1  Any medication changes, allergies to medications, adverse drug reactions, diagnosis change, or new procedure performed?: [x] No    [] Yes (see summary sheet for update)  Subjective functional status/changes:   [] No changes reported  Got a little sore last night    OBJECTIVE         28 min Therapeutic Exercise:  [x] See flow sheet :   Rationale: increase ROM, increase strength, and improve coordination to improve the patients ability to improve functional tolerance     20 min Therapeutic Activity:  [x]  See flow sheet :   Rationale: increase ROM, increase strength, and improve coordination  to improve the patients ability to perform increased activity     8 min Manual Therapy:  PROM Left Knee Flx overstretch,Effleurage left distal quad   The manual therapy interventions were performed at a separate and distinct time from the therapeutic activities interventions.   Rationale: decrease pain, increase ROM, and increase tissue extensibility to improve function          With   [x] TE   [x] TA   [] neuro   [] other: Patient Education: [x] Review HEP    [] Progressed/Changed HEP based on:   [] positioning   [] body mechanics   [] transfers   [] heat/ice application    [] other:      Other Objective/Functional Measures:   - Pt arrived to treatment session with an improved gait pattern and increased ease of mobility  - Add Knee Flx Iso with PT Resistance  - Knee Flx 112     Pain Level (0-10 scale) post treatment: 0    ASSESSMENT/Changes in Function:   Good response to treatment with improved ability for step up with an increased pace  - Good tolerance with added resistance for [facility]    Patient will continue to benefit from skilled PT services to modify and progress therapeutic interventions, address functional mobility deficits, address ROM deficits, address strength deficits, analyze and address soft tissue restrictions, and analyze and cue movement patterns to attain remaining goals. []  See Plan of Care  []  See progress note/recertification  []  See Discharge Summary         Progress towards goals / Updated goals:  Goals for this certification period to be accomplished in 24 treatments:  1. Pt to tolerate 30 min or more of TE and/or Interventions w/o increased s/s                        Status at last note/certification:Progressing, reports discomfort with knee flexion movement             Current Status:  Met with no increeased s/s 1/20/23                2.  Pt will report 75% improvement or better with dysfunction to show a significant increase in ability to tolerate ADL                        Status at last note/certification: patient reports 50% improvement             Current Status:  Met at 90% 1/25/23              3.  Pt will demonstrate MMT Left Knee Flx at 5-/5 or better for good stability for progress to moderate community ambulation. Status at last note/certification: Initiated             Current Status: MMT Left Knee Flx 4+-5- with no pain 1/25/23              4.  Pt will demonstrate ambulation for 300' (I) with some discomfort for carryover to (I) ambulation between rooms in her home                          Status at last note/certification: Progressing at 1x300 and 1x500' with less discomfort             Current Status:  Progressing at 0' with improved knee flx 1/19/23            5.   Pt will demonstrate AROM left knee Flx to 120* or better for carryover to performing sit - stand from a standard height seat with little to no difficulty                          Status at last note/certification: AROM Left Knee Flx 101* after stiffness is eased with exercise             Current Status:  Progressing at AROM 121 after treatment today 1/24/23    PLAN  [x]  Upgrade activities as tolerated     [x]  Continue plan of care  []  Update interventions per flow sheet       []  Discharge due to:_  []  Other:_      Robyn Hough, PT 1/25/2023  9:04 AM    Future Appointments   Date Time Provider Hazel Vicente   1/26/2023  4:30 PM Eliezer Gee, PT [facility]   1/27/2023  9:00 AM Eliezer Gee, PT [facility]   1/31/2023  1:00 PM Eliezer Gee, PT [facility]   2/1/2023  9:30 AM RICARDA Moran BS AMB   2/2/2023  1:00 PM Hung Arguello, PTA [facility]   2/6/2023  9:00 AM Eliezer Gee, PT [facility]   2/9/2023  1:30 PM Eliezer Gee, PT [facility]

## 2023-01-26 ENCOUNTER — HOSPITAL ENCOUNTER (OUTPATIENT)
Dept: PHYSICAL THERAPY | Age: 73
Discharge: HOME | End: 2023-01-26
Payer: MEDICARE

## 2023-01-26 PROCEDURE — 97140 MANUAL THERAPY 1/> REGIONS: CPT

## 2023-01-26 PROCEDURE — 97110 THERAPEUTIC EXERCISES: CPT

## 2023-01-26 PROCEDURE — 97530 THERAPEUTIC ACTIVITIES: CPT

## 2023-01-26 NOTE — PROGRESS NOTES
PT DAILY TREATMENT NOTE     Patient Name: Shasta Espinosa  Date:2023  : 1950  [x]  Patient  Verified  Payor: [facility] [place] MEDICARE / Plan: [employer] [address] / Product Type: Managed Care Medicare /    In time:4:10  Out time:5:03  Total Treatment Time (min): 48  Visit #: 24 of 40    Medicare/BCBS Only   Total Timed Codes (min):  53 1:1 Treatment Time:  48       Treatment Area: Pain in left knee [M25.562]    SUBJECTIVE  Pain Level (0-10 scale): 0  Any medication changes, allergies to medications, adverse drug reactions, diagnosis change, or new procedure performed?: [x] No    [] Yes (see summary sheet for update)  Subjective functional status/changes:   [] No changes reported  No pain today but had a hard time sleeping last night due to pain    OBJECTIVE         Modality rationale: decrease inflammation, decrease pain, and increase tissue extensibility to improve the patients ability to improve ease knee Flx   Min Type Additional Details    [] Estim:  []Unatt       []IFC  []Premod                        []Other:  []w/ice   []w/heat  Position:  Location:    [] Estim: []Att    []TENS instruct  []NMES                    []Other:  []w/US   []w/ice   []w/heat  Position:  Location:    []  Traction: [] Cervical       []Lumbar                       [] Prone          []Supine                       []Intermittent   []Continuous Lbs:  [] before manual  [] after manual   8 [x]  Ultrasound: [x]Continuous   [] Pulsed                           [x]1MHz   []3MHz Location: Left Quad  W/cm2: 1.7    []  Iontophoresis with dexamethasone         Location: [] Take home patch   [] In clinic    []  Ice     []  heat  []  Ice massage  []  Laser   []  Anodyne Position:  Location:    []  Laser with stim  []  Other: Position:  Location:    []  Vasopneumatic Device  Pre-treatment girth:  Post-treatment girth:  Measured at (location):  Pressure:       [] lo [] med [] hi   Temperature: [] lo [] med [] hi   [x] Skin assessment post-treatment:  [x]intact []redness- no adverse reaction    []redness - adverse reaction:    12 min Therapeutic Exercise:  [x] See flow sheet :   Rationale: increase ROM, increase strength, and improve coordination to improve the patients ability to improve functional tolerance     23 min Therapeutic Activity:  [x]  See flow sheet :   Rationale: increase ROM, increase strength, and improve coordination  to improve the patients ability to perform increased activity     10 min Manual Therapy:  PROM Left Knee Flx overstretch, STM/DTM/Effleurage left distal quad   The manual therapy interventions were performed at a separate and distinct time from the therapeutic activities interventions. Rationale: decrease pain, increase ROM, and increase tissue extensibility to improve function          With   [x] TE   [x] TA   [] neuro   [] other: Patient Education: [x] Review HEP    [] Progressed/Changed HEP based on:   [] positioning   [] body mechanics   [] transfers   [] heat/ice application    [] other:      Other Objective/Functional Measures:   - Decreased knee Flx with ambulation upon arrival today  - Add Knee Ext  Iso with PT Resistance       Pain Level (0-10 scale) post treatment: 0    ASSESSMENT/Changes in Function:   Good response to treatment with decreased tightness reported at the left quad    Patient will continue to benefit from skilled PT services to modify and progress therapeutic interventions, address functional mobility deficits, address ROM deficits, address strength deficits, analyze and address soft tissue restrictions, and analyze and cue movement patterns to attain remaining goals. []  See Plan of Care  []  See progress note/recertification  []  See Discharge Summary         Progress towards goals / Updated goals:  Goals for this certification period to be accomplished in 24 treatments:  1.   Pt to tolerate 30 min or more of TE and/or Interventions w/o increased s/s Status at last note/certification:Progressing, reports discomfort with knee flexion movement             Current Status:  Met with no increeased s/s 1/20/23                2.  Pt will report 75% improvement or better with dysfunction to show a significant increase in ability to tolerate ADL                        Status at last note/certification: patient reports 50% improvement             Current Status:  Met at 90% 1/25/23              3.  Pt will demonstrate MMT Left Knee Flx at 5-/5 or better for good stability for progress to moderate community ambulation. Status at last note/certification: Initiated             Current Status: MMT Left Knee Flx 4+-5- with no pain 1/25/23              4.  Pt will demonstrate ambulation for 300' (I) with some discomfort for carryover to (I) ambulation between rooms in her home                          Status at last note/certification: Progressing at 1x300 and 1x500' with less discomfort             Current Status:  Progressing at 0' with improved knee flx 1/19/23            5.   Pt will demonstrate AROM left knee Flx to 120* or better for carryover to performing sit - stand from a standard height seat with little to no difficulty                          Status at last note/certification: AROM Left Knee Flx 101* after stiffness is eased with exercise             Current Status:  Progressing at AROM 121 after treatment today 1/24/23    PLAN  [x]  Upgrade activities as tolerated     [x]  Continue plan of care  []  Update interventions per flow sheet       []  Discharge due to:_  []  Other:_      Lindie Cancer, PT 1/26/2023  4:24 PM    Future Appointments   Date Time Provider Hazel Vicente   1/26/2023  4:30 PM Aris Holt, PT [facility]   1/27/2023  9:00 AM Aris Holt PT [facility]   1/31/2023  1:00 PM Aris Holt PT [facility]   2/1/2023  9:30 AM RICARDA Booth BS AMB   2/2/2023  1:00 PM Marine Moore Manuel Severe [facility]   2/6/2023  9:00 AM Gudelia Arriaza, PT [facility]   2/9/2023  1:30 PM Gudelia Arriaza, PT [facility]

## 2023-01-27 ENCOUNTER — HOSPITAL ENCOUNTER (OUTPATIENT)
Dept: PHYSICAL THERAPY | Age: 73
Discharge: HOME | End: 2023-01-27
Payer: MEDICARE

## 2023-01-27 ENCOUNTER — APPOINTMENT (OUTPATIENT)
Dept: PHYSICAL THERAPY | Age: 73
End: 2023-01-27
Payer: MEDICARE

## 2023-01-27 PROCEDURE — 97530 THERAPEUTIC ACTIVITIES: CPT

## 2023-01-27 PROCEDURE — 97110 THERAPEUTIC EXERCISES: CPT

## 2023-01-27 PROCEDURE — 97140 MANUAL THERAPY 1/> REGIONS: CPT

## 2023-01-27 NOTE — PROGRESS NOTES
PT DAILY TREATMENT NOTE     Patient Name: Joselyn Tran  Date:2023  : 1950  [x]  Patient  Verified  Payor: Dion Padgett / Plan: [employer] / Product Type: Managed Care Medicare /    In time:905  Out time:100  Total Treatment Time (min): 61  Visit #: 25 of 40    Medicare/BCBS Only   Total Timed Codes (min):  61 1:1 Treatment Time:  61       Treatment Area: Pain in left knee [M25.562]    SUBJECTIVE  Pain Level (0-10 scale): 2  Any medication changes, allergies to medications, adverse drug reactions, diagnosis change, or new procedure performed?: [x] No    [] Yes (see summary sheet for update)  Subjective functional status/changes:   [] No changes reported  Quadriceps muscle feels better.  The knee is just stiff on the outside and below the knee    OBJECTIVE         Modality rationale: decrease inflammation, decrease pain, and increase tissue extensibility to improve the patients ability to improve ease knee Flx   Min Type Additional Details    [] Estim:  []Unatt       []IFC  []Premod                        []Other:  []w/ice   []w/heat  Position:  Location:    [] Estim: []Att    []TENS instruct  []NMES                    []Other:  []w/US   []w/ice   []w/heat  Position:  Location:    []  Traction: [] Cervical       []Lumbar                       [] Prone          []Supine                       []Intermittent   []Continuous Lbs:  [] before manual  [] after manual   8 [x]  Ultrasound: [x]Continuous   [] Pulsed                           [x]1MHz   []3MHz Location: Post medial knee  W/cm2: 1.7    []  Iontophoresis with dexamethasone         Location: [] Take home patch   [] In clinic    []  Ice     []  heat  []  Ice massage  []  Laser   []  Anodyne Position:  Location:    []  Laser with stim  []  Other: Position:  Location:    []  Vasopneumatic Device  Pre-treatment girth:  Post-treatment girth:  Measured at (location):  Pressure:       [] lo [] med [] hi Temperature: [] lo [] med [] hi   [x] Skin assessment post-treatment:  [x]intact []redness- no adverse reaction    []redness - adverse reaction:    23 min Therapeutic Exercise:  [x] See flow sheet : Assess FOTO   Rationale: increase ROM, increase strength, and improve coordination to improve the patients ability to improve functional tolerance     17 min Therapeutic Activity:  [x]  See flow sheet :   Rationale: increase ROM, increase strength, and improve coordination  to improve the patients ability to perform increased activity     12 min Manual Therapy:  PROM Left Knee Flx overstretch, STM/DTM/Effleurage left lower leg   The manual therapy interventions were performed at a separate and distinct time from the therapeutic activities interventions. Rationale: decrease pain, increase ROM, and increase tissue extensibility to improve function          With   [x] TE   [x] TA   [] neuro   [] other: Patient Education: [x] Review HEP    [] Progressed/Changed HEP based on:   [] positioning   [] body mechanics   [] transfers   [] heat/ice application    [] other:      Other Objective/Functional Measures:   - AROM Left Knee Flx 117 upon arrival  - Improved gait pattern upon arrival  - Pt showing increased strength with resisted training       Pain Level (0-10 scale) post treatment: 0    ASSESSMENT/Changes in Function:   Good response to treatment with decreased tightness reported at the left quad  - Increased AROM Left Knee Flx 120 after treatment    Patient will continue to benefit from skilled PT services to modify and progress therapeutic interventions, address functional mobility deficits, address ROM deficits, address strength deficits, analyze and address soft tissue restrictions, and analyze and cue movement patterns to attain remaining goals.      []  See Plan of Care  []  See progress note/recertification  []  See Discharge Summary         Progress towards goals / Updated goals:  Goals for this certification period to be accomplished in 24 treatments:  1. Pt to tolerate 30 min or more of TE and/or Interventions w/o increased s/s                        Status at last note/certification:Progressing, reports discomfort with knee flexion movement             Current Status:  Met with no increeased s/s 1/20/23                2.  Pt will report 75% improvement or better with dysfunction to show a significant increase in ability to tolerate ADL                        Status at last note/certification: patient reports 50% improvement             Current Status:  Met at 90% 1/25/23              3.  Pt will demonstrate MMT Left Knee Flx at 5-/5 or better for good stability for progress to moderate community ambulation. Status at last note/certification: Initiated             Current Status: MMT Left Knee Flx 4+-5- with no pain 1/25/23              4.  Pt will demonstrate ambulation for 300' (I) with some discomfort for carryover to (I) ambulation between rooms in her home                          Status at last note/certification: Progressing at 1x300 and 1x500' with less discomfort             Current Status:  Progressing at 0' with improved knee flx 1/19/23            5.   Pt will demonstrate AROM left knee Flx to 120* or better for carryover to performing sit - stand from a standard height seat with little to no difficulty                          Status at last note/certification: AROM Left Knee Flx 101* after stiffness is eased with exercise             Current Status:  Progressing at AROM 122 after treatment today 1/27/23    PLAN  [x]  Upgrade activities as tolerated     [x]  Continue plan of care  []  Update interventions per flow sheet       []  Discharge due to:_  []  Other:_      Fabián Traylor, PT 1/27/2023  9:26 AM    Future Appointments   Date Time Provider Hazel Vicente   1/31/2023  1:00 PM Gudelia Arriaza, PT [facility]   2/1/2023  9:30 AM RICARDA Bullard BS AMB   2/2/2023 1:00 PM Mariaa Chavis PTA [facility]   2/6/2023  9:00 AM Yosvany Counter, PT [facility]   2/9/2023  1:30 PM Yosvany Counter, PT [facility]

## 2023-01-31 ENCOUNTER — APPOINTMENT (OUTPATIENT)
Dept: PHYSICAL THERAPY | Age: 73
End: 2023-01-31
Payer: MEDICARE

## 2023-01-31 ENCOUNTER — HOSPITAL ENCOUNTER (OUTPATIENT)
Dept: PHYSICAL THERAPY | Age: 73
Discharge: HOME | End: 2023-01-31
Payer: MEDICARE

## 2023-01-31 PROCEDURE — 97110 THERAPEUTIC EXERCISES: CPT

## 2023-01-31 PROCEDURE — 97530 THERAPEUTIC ACTIVITIES: CPT

## 2023-01-31 NOTE — PROGRESS NOTES
PT DAILY TREATMENT NOTE     Patient Name: Penny Sosa  Date:2023  : 1950  [x]  Patient  Verified  Payor: Ashlie Varner / Plan: [employer] / Product Type: Managed Care Medicare /    In time:1:05  Out time:1:44  Total Treatment Time (min): 39  Visit #: 26 of 40    Medicare/BCBS Only   Total Timed Codes (min):  39 1:1 Treatment Time:  39       Treatment Area: Pain in left knee [M25.562]    SUBJECTIVE  Pain Level (0-10 scale):  2  Any medication changes, allergies to medications, adverse drug reactions, diagnosis change, or new procedure performed?: [x] No    [] Yes (see summary sheet for update)  Subjective functional status/changes:   [] No changes reported  Still feeling stiff    OBJECTIVE           22 min Therapeutic Exercise:  [x] See flow sheet : Assess FOTO   Rationale: increase ROM, increase strength, and improve coordination to improve the patients ability to improve functional tolerance     17 min Therapeutic Activity:  [x]  See flow sheet :   Rationale: increase ROM, increase strength, and improve coordination  to improve the patients ability to perform increased activity             With   [x] TE   [x] TA   [] neuro   [] other: Patient Education: [x] Review HEP    [] Progressed/Changed HEP based on:   [] positioning   [] body mechanics   [] transfers   [] heat/ice application    [] other:      Other Objective/Functional Measures:   - AROM Left Knee Flx 121 upon arrival  - Initiate Seated fast paced knee Flx/Ext       Pain Level (0-10 scale) post treatment: 2-3    ASSESSMENT/Changes in Function:   Good response to treatment with decreased tightness reported at the left quad  - Increased AROM Left Knee Flx 120 after treatment    Patient will continue to benefit from skilled PT services to modify and progress therapeutic interventions, address functional mobility deficits, address ROM deficits, address strength deficits, analyze and address soft tissue restrictions, and analyze and cue movement patterns to attain remaining goals. []  See Plan of Care  []  See progress note/recertification  []  See Discharge Summary         Progress towards goals / Updated goals:  Goals for this certification period to be accomplished in 24 treatments:  1. Pt to tolerate 30 min or more of TE and/or Interventions w/o increased s/s                        Status at last note/certification:Progressing, reports discomfort with knee flexion movement             Current Status:  Met with no increeased s/s 1/20/23                2.  Pt will report 75% improvement or better with dysfunction to show a significant increase in ability to tolerate ADL                        Status at last note/certification: patient reports 50% improvement             Current Status:  Met at 90% 1/25/23              3.  Pt will demonstrate MMT Left Knee Flx at 5-/5 or better for good stability for progress to moderate community ambulation. Status at last note/certification: Initiated             Current Status: MMT Left Knee Flx 4+-5- with no pain 1/25/23              4.  Pt will demonstrate ambulation for 300' (I) with some discomfort for carryover to (I) ambulation between rooms in her home                          Status at last note/certification: Progressing at 1x300 and 1x500' with less discomfort             Current Status:  Progressing at 0' with improved knee flx 1/19/23            5.   Pt will demonstrate AROM left knee Flx to 120* or better for carryover to performing sit - stand from a standard height seat with little to no difficulty                          Status at last note/certification: AROM Left Knee Flx 101* after stiffness is eased with exercise             Current Status:  Progressing at AROM 121 after performing knee Flx/Ext exercises 1/31/23    PLAN  [x]  Upgrade activities as tolerated     [x]  Continue plan of care  []  Update interventions per flow sheet       []  Discharge due to:_  []  Other:_      Power Graham, PT 1/31/2023  9:26 AM    Future Appointments   Date Time Provider Hazel Vicente   2/1/2023  9:30 AM RICARDA Mathur AMB   2/2/2023  1:00 PM Julia Roth,  [facility]   2/6/2023  9:00 AM Ab Garzon, PT [facility]   2/9/2023  1:30 PM Ab Garzon PT [facility]

## 2023-02-01 ENCOUNTER — VIRTUAL VISIT (OUTPATIENT)
Dept: ORTHOPEDIC SURGERY | Age: 73
End: 2023-02-01
Payer: MEDICARE

## 2023-02-01 ENCOUNTER — APPOINTMENT (OUTPATIENT)
Dept: PHYSICAL THERAPY | Age: 73
End: 2023-02-01
Payer: MEDICARE

## 2023-02-01 DIAGNOSIS — Z98.890 STATUS POST SURGICAL MANIPULATION OF KNEE JOINT: Primary | ICD-10-CM

## 2023-02-01 PROCEDURE — 99024 POSTOP FOLLOW-UP VISIT: CPT | Performed by: NURSE PRACTITIONER

## 2023-02-01 NOTE — PROGRESS NOTES
Name: Almita Calderon    : 1950   Weight Loss Metrics 2022   Today's Wt 193 lb 196 lb 195 lb   BMI 31.15 kg/m2 31.64 kg/m2 31.47 kg/m2       There is no height or weight on file to calculate BMI. Service Dept: [address] [place] [address] and Sports Medicine    Patient's Pharmacies:    [id number] Deepti Srinivasan, [facility] [address] [address]  Phone: [phone] Fax: [phone]       Chief Complaint   Patient presents with    Knee Pain        HPI:  Patient follows up today for recheck after a left knee manipulation done on 2023. Her original left TKA was done on 2022. She has been in physical therapy daily and doing home exercises over the past 2 weeks since the manipulation and says that she has been able to reach 123 degrees, although this is under much duress. She is trying to walk daily but says that she feels a \"tight band\" across the front of her knee. There were no vitals taken for this visit. Allergies   Allergen Reactions    Latex Rash    Aleve [Naproxen Sodium] Anaphylaxis    Nickel Rash    Penicillins Shortness of Breath and Rash    Ragweed Sneezing    Sulfa (Sulfonamide Antibiotics) Rash        Current Outpatient Medications   Medication Sig Dispense Refill    zinc gluconate 50 mg tablet 1 tablet      irbesartan-hydroCHLOROthiazide (AVALIDE) 150-12.5 mg per tablet Take 1 Tablet by mouth daily. celecoxib (CELEBREX) 200 mg capsule 1 capsule with food      cholecalciferol (VITAMIN D3) (5000 Units/125 mcg) tab tablet Take 5,000 Units by mouth daily. pantoprazole (PROTONIX) 40 mg tablet 1 tablet      lutein-zeaxanthin 20 mg- 1,000 mcg cap Take 1 Capsule by mouth daily. pravastatin (PRAVACHOL) 80 mg tablet 1 Tablet daily. vit A,C,E-zinc-copper (PreserVision AREDS) cap capsule as directed.           Patient Active Problem List   Diagnosis Code    OA (osteoarthritis) of knee M17.9        Family History   Problem Relation Age of Onset    No Known Problems Mother     No Known Problems Father         Social History     Socioeconomic History    Marital status:    Tobacco Use    Smoking status: Former     Packs/day: 1.00     Years: 15.00     Pack years: 15.00     Types: Cigarettes     Quit date:      Years since quittin.    Smokeless tobacco: Never   Vaping Use    Vaping Use: Never used   Substance and Sexual Activity    Alcohol use: Not Currently    Drug use: Never    Sexual activity: Not Currently        History reviewed. No pertinent surgical history. Past Medical History:   Diagnosis Date    High cholesterol     Hypertension     Osteoporosis         I have reviewed and agree with [address] and ROS and intake form in chart and the record. Review of Systems:   Patient is pleasant appearing individual, appropriately dressed, well hydrated, well nourished, who is alert, appropriately oriented for age, and in no acute distress with a normal gait andnotmal affect who does not appear to be in any significant pain. Encounter Diagnoses     ICD-10-CM ICD-9-CM   1. Status post surgical manipulation of knee joint  Z98.890 V45.89       Celeste Yun, who was evaluated through a synchronous (real-time) audio-video encounter, and/or her healthcare decision maker, is aware that it is a billable service, with coverage as determined by her insurance carrier. She provided verbal consent to proceed: Yes, and patient identification was verified. It was conducted pursuant to the emergency declaration under the [facility] [facility], [facility] authority and the [employer] Act. A caregiver was present when appropriate. Ability to conduct physical exam was limited. I was in the office. The patient was at home.     Return to Office:   Today I discussed with the patient the importance of continuing daily home exercises and stretches, 3-4 times per day. She is now down to twice weekly with her physical therapy. I explained to her that she should continue these exercises and stretches for the next several months to come as it could take a year to 18 months for the scar tissue to fully mature. I stressed the fact that if she were to stop doing the exercises and stretches she could regress and develop worsening stiffness. As long as she is doing well no further routine follow-up will be scheduled but she can certainly call us anytime with new concerns. Otherwise she will plan to follow-up with Dr. Gavi Marmolejo in November for a yearly recheck.

## 2023-02-01 NOTE — PROGRESS NOTES
[facility] at [address] [facility][1]., [address] [facility], [address] [place] [address]  Phone: [phone]      Fax:  [phone]    Physician Update  [x] Progress Note  [] Discharge Summary    Patient name: Faisal Broussard Start of Care: 2022   Referral source: Rachid Rivera MD : 1950               Medical Diagnosis: Pain in left knee [M25.562]  Payor: Shruthi Cue / Plan: [address] / Product Type: [employer] Medicare /  Onset Date:22               Treatment Diagnosis: Left Knee Pain   Prior Hospitalization: see medical history Provider#: [id number]   Medications: Verified on Patient summary List   Comorbidities: DM, OA,  Pacemaker, HTN, Hearing Impaired  Prior Level of Function: Frequent left knee pain  Visits from Start of Care: 26    Missed Visits: 0    Status at Evaluation/Last Progress Note: Patient has shown good progress with this treatment program. Pain as of last visit was 3/10 with primary c/o left knee stiffness vs pain with decreased ease of motion. Patient has shown decreased pain and increased strength and mobility since start of treatment. See other objective measures below for additional details. Patient reports 90% improvement with overall involvement. FOTO score is 72. All STG/LTGs achieved as identified below. Fall Risk Assessment: Patient demonstrates no Fall Risk      Other Objective/Functional Measures:   - Increased AROM Left Knee Flx 121 after performing a progressive paced knee Flx/Ext exercise  - Improved gait pattern after aggressive AROM to improve Flx  - Left knee mobility gets limited with decreased ease of motion with sitting, requiring active Flx/Ext mobility to limit stiffness to allow increased mobilty      Progress towards Goals:   Goals for this certification period to be accomplished in 24 treatments:  1.   Pt to tolerate 30 min or more of TE and/or Interventions w/o increased s/s Status at last note/certification:Progressing, reports discomfort with knee flexion movement             Current Status:  Met with no increeased s/s                 2.  Pt will report 75% improvement or better with dysfunction to show a significant increase in ability to tolerate ADL                        Status at last note/certification: patient reports 50% improvement             Current Status:  Met at 90%               3.  Pt will demonstrate MMT Left Knee Flx at 5-/5 or better for good stability for progress to moderate community ambulation. Status at last note/certification: Initiated             Current Status: MMT Left Knee Flx 4+-5- with no pain               4.  Pt will demonstrate ambulation for 300' (I) with some discomfort for carryover to (I) ambulation between rooms in her home                          Status at last note/certification: Progressing at 1x300 and 1x500' with less discomfort             Current Status:  Progressing at 0' with improved knee flx             5.  Pt will demonstrate AROM left knee Flx to 120* or better for carryover to performing sit - stand from a standard height seat with little to no difficulty                          Status at last note/certification: AROM Left Knee Flx 101* after stiffness is eased with exercise             Current Status:  Progressing at AROM 121 after performing knee Flx/Ext exercises      Goals: to be achieved in 18 treatments:            1. Pt will demonstrate MMT Left Knee Flx at 5-/5 or better for good stability for progress to moderate community ambulation.                           Status at last note/certification:  MMT Left Knee Flx 4+-5- with no pain             Current Status:               2.  Pt will ambulate on TM for 1/4 mile or more for carryover to performing usual community ambulation with little to no difficulty                          Status at last note/certification:   Progressing at 500' with improved knee flx             Current Status:             3.  Pt will demonstrate AROM left knee Flx to 120* or better with little reports of stiffness for carryover to performing sit - stand from a standard height seat with little to no difficulty                          Status at last note/certification:  Progressing at AROM 121* after performing knee Flx/Ext exercises              Current Status:    ASSESSMENT/RECOMMENDATIONS:  [x]Continue therapy per initial plan/protocol at a frequency of  2-3 x per week for 18 more visits  []Continue therapy with the following recommended changes:_____________________      _____________________________________________________________________  []Discontinue therapy progressing towards or have reached established goals  []Discontinue therapy due to lack of appreciable progress towards goals  []Discontinue therapy due to lack of attendance or compliance  []Await Physician's recommendations/decisions regarding therapy  []Other:________________________________________________________________    Thank you for this referral.   Bunny Colon, PT 1/31/2023 7:58 PM  NOTE TO PHYSICIAN:  PLEASE COMPLETE THE ORDERS BELOW AND   FAX TO [facility]: [phone]  If you are unable to process this request in 24 hours please contact our office: [phone]    []  I have read the above report and request that my patient continue as recommended. []  I have read the above report and request that my patient continue therapy with the following changes/special instructions:________________________________________  []I have read the above report and request that my patient be discharged from therapy.     Physician's Signature:____________Date:_________TIME:________     Rachel Rivera MD  ** Signature, Date and Time must be completed for valid certification **

## 2023-02-02 ENCOUNTER — HOSPITAL ENCOUNTER (OUTPATIENT)
Dept: PHYSICAL THERAPY | Age: 73
Discharge: HOME | End: 2023-02-02
Payer: MEDICARE

## 2023-02-02 PROCEDURE — 97110 THERAPEUTIC EXERCISES: CPT

## 2023-02-02 PROCEDURE — 97112 NEUROMUSCULAR REEDUCATION: CPT

## 2023-02-02 PROCEDURE — 97140 MANUAL THERAPY 1/> REGIONS: CPT

## 2023-02-02 NOTE — PROGRESS NOTES
PT DAILY TREATMENT NOTE     Patient Name: Gevena Cockayne  Date:2023  : 1950  [x]  Patient  Verified  Payor: [employer] MEDICARE / Plan: [address] / Product Type: Managed Care Medicare /    In time:1200  Out time:1240  Total Treatment Time (min): 40  Visit #: 27 of 40    Medicare/BCBS Only   Total Timed Codes (min):  40 1:1 Treatment Time:  40       Treatment Area: Pain in left knee [M25.562]    SUBJECTIVE  Pain Level (0-10 scale): 2-3/10  Any medication changes, allergies to medications, adverse drug reactions, diagnosis change, or new procedure performed?: [x] No    [] Yes (see summary sheet for update)  Subjective functional status/changes:   [] No changes reported  No changes to report     OBJECTIVE        22 min Therapeutic Exercise:  [x] See flow sheet :   Rationale: increase ROM and increase strength to improve the patients ability to restore PLOF    8 min Neuromuscular Re-education:  [x]  See flow sheet :   Rationale: increase ROM, increase strength, and improve coordination  to improve the patients ability to activate quads and hamstrings without compensation      10  min Manual Therapy:  PROM with overstretch into flx    The manual therapy interventions were performed at a separate and distinct time from the therapeutic activities interventions. Rationale: decrease pain, increase ROM, and increase tissue extensibility to perform ADLs            With   [x] TE   [] TA   [] neuro   [] other: Patient Education: [x] Review HEP    [] Progressed/Changed HEP based on:   [] positioning   [] body mechanics   [] transfers   [] heat/ice application    [] other:      Other Objective/Functional Measures:   Per flow sheet and in Assessment      Pain Level (0-10 scale) post treatment: 1/10 with stiffness    ASSESSMENT/Changes in Function: Patient tolerated treatment session fair today.  Patient had no complaints with addition of 8\" step with step ups and 2# to HS curls to exercise program to accomplish increased functional strength and mobility. Cues for decreased pace with mini squats and HS curls to ensure proper full ROM and form is achieved. Occasional cues for encouragement d/t patient's self-doubt and hesitancy to perform certain exercises. Patient continues to make steady progress toward goals and would benefit from continued skilled PT intervention to address remaining deficits outlined in goals below. Noted bruising with TTP at the superior and inferior portions of the incision site. Patient reports continued stiffness in the knee that increases with ADLs/movement. Patient will continue to benefit from skilled PT services to modify and progress therapeutic interventions, address functional mobility deficits, address ROM deficits, address strength deficits, analyze and address soft tissue restrictions, analyze and cue movement patterns, and analyze and modify body mechanics/ergonomics to attain remaining goals. []  See Plan of Care  []  See progress note/recertification  []  See Discharge Summary         Progress towards goals / Updated goals:  Goals: to be achieved in 18 treatments:            1. Pt will demonstrate MMT Left Knee Flx at 5-/5 or better for good stability for progress to moderate community ambulation.                           Status at last note/certification:  MMT Left Knee Flx 4+-5- with no pain             Current Status:               2.  Pt will ambulate on TM for 1/4 mile or more for carryover to performing usual community ambulation with little to no difficulty                          Status at last note/certification:   Progressing at 500' with improved knee flx             Current Status:             3.  Pt will demonstrate AROM left knee Flx to 120* or better with little reports of stiffness for carryover to performing sit - stand from a standard height seat with little to no difficulty                          Status at last note/certification:  Progressing at AROM 121* after performing knee Flx/Ext exercises              Current Status:     PLAN  [x]  Upgrade activities as tolerated     [x]  Continue plan of care  []  Update interventions per flow sheet       []  Discharge due to:_  []  Other:_      Aurora Coulter, PTA 2/2/2023  12:02 PM    Future Appointments   Date Time Provider Hazel Vicente   2/6/2023  9:00 AM Montserrat Mcduffie PT [facility]   2/9/2023  1:30 PM Montserrat Mcduffie PT [facility]

## 2023-02-03 ENCOUNTER — APPOINTMENT (OUTPATIENT)
Dept: PHYSICAL THERAPY | Age: 73
End: 2023-02-03
Payer: MEDICARE

## 2023-02-06 ENCOUNTER — HOSPITAL ENCOUNTER (OUTPATIENT)
Dept: PHYSICAL THERAPY | Age: 73
Discharge: HOME | End: 2023-02-06
Payer: MEDICARE

## 2023-02-06 PROCEDURE — 97110 THERAPEUTIC EXERCISES: CPT

## 2023-02-06 PROCEDURE — 97530 THERAPEUTIC ACTIVITIES: CPT

## 2023-02-06 PROCEDURE — 97140 MANUAL THERAPY 1/> REGIONS: CPT

## 2023-02-06 NOTE — PROGRESS NOTES
PT DAILY TREATMENT NOTE     Patient Name: Maxwell Mims  Date:2023  : 1950  [x]  Patient  Verified  Payor: [employer] MEDICARE / Plan: [address] / Product Type: Managed Care Medicare /    In time:1:35  Out time:2:28  Total Treatment Time (min): 48  Visit #: 28 of 40    Medicare/BCBS Only   Total Timed Codes (min):  53 1:1 Treatment Time:  38       Treatment Area: Pain in left knee [M25.562]    SUBJECTIVE  Pain Level (0-10 scale): 0  Any medication changes, allergies to medications, adverse drug reactions, diagnosis change, or new procedure performed?: [x] No    [] Yes (see summary sheet for update)  Subjective functional status/changes:   [] No changes reported  Still stiff. Feels like a band is going across my knee. OBJECTIVE      29 min Therapeutic Exercise:  [x] See flow sheet :   Rationale: increase ROM, increase strength, and improve coordination to improve the patients ability to perform increased activity    8 min Therapeutic Activity:  [x]  See flow sheet :   Rationale: increase ROM, increase strength, and improve coordination  to improve the patients ability to improve activity tolerance     16 min Manual Therapy:  Effleurage left knee, STM/DTM left proximal scar region, Scar massage to limit adhesions distal and proximal surgical scar, KT I-Strip for spacing at the distal surgical scaring      The manual therapy interventions were performed at a separate and distinct time from the therapeutic activities interventions.   Rationale: decrease pain, increase ROM, increase tissue extensibility, and decrease edema  to improve activity tolerance          With   [x] TE   [] TA   [] neuro   [] other: Patient Education: [x] Review HEP    [] Progressed/Changed HEP based on:   [] positioning   [] body mechanics   [] transfers   [] heat/ice application    [] other:      Other Objective/Functional Measures:   - Tight Tissue at the proximal surgical scar  - AROM Left Knee Flx 121*  - Pt showing increased ease of knee flx with much less hesitation      Pain Level (0-10 scale) post treatment: 0    ASSESSMENT/Changes in Function:   - Good tolerance to treatment with decreased tissue tightness at the proximal surgical scar, allowing better tissue mobility  - AROM Left knee Flx 126* after treatment    Patient will continue to benefit from skilled PT services to modify and progress therapeutic interventions, address functional mobility deficits, address ROM deficits, address strength deficits, analyze and address soft tissue restrictions, and analyze and cue movement patterns to attain remaining goals. []  See Plan of Care  []  See progress note/recertification  []  See Discharge Summary         Progress towards goals / Updated goals:  1. Pt will demonstrate MMT Left Knee Flx at 5-/5 or better for good stability for progress to moderate community ambulation.                           Status at last note/certification:  MMT Left Knee Flx 4+-5- with no pain             Current Status:               2.  Pt will ambulate on TM for 1/4 mile or more for carryover to performing usual community ambulation with little to no difficulty                          Status at last note/certification:   Progressing at 500' with improved knee flx             Current Status:             3.  Pt will demonstrate AROM left knee Flx to 120* or better with little reports of stiffness for carryover to performing sit - stand from a standard height seat with little to no difficulty                          Status at last note/certification:  Progressing at AROM 121* after performing knee Flx/Ext exercises              Current Status: Progressing at AROM Knee Flx 121 progressing to 126* after treatment 2/6/23    PLAN  [x]  Upgrade activities as tolerated     [x]  Continue plan of care  []  Update interventions per flow sheet       []  Discharge due to:_  []  Other:_      Wyatt Ayoub PT 2/6/2023  2:02 PM    Future Appointments   Date Time Provider Hazel Vicente   2/10/2023  7:30 [facility] [facility]

## 2023-02-07 ENCOUNTER — APPOINTMENT (OUTPATIENT)
Dept: PHYSICAL THERAPY | Age: 73
End: 2023-02-07
Payer: MEDICARE

## 2023-02-08 ENCOUNTER — APPOINTMENT (OUTPATIENT)
Dept: PHYSICAL THERAPY | Age: 73
End: 2023-02-08
Payer: MEDICARE

## 2023-02-09 ENCOUNTER — APPOINTMENT (OUTPATIENT)
Dept: PHYSICAL THERAPY | Age: 73
End: 2023-02-09
Payer: MEDICARE

## 2023-02-10 ENCOUNTER — HOSPITAL ENCOUNTER (OUTPATIENT)
Dept: PHYSICAL THERAPY | Age: 73
Discharge: HOME | End: 2023-02-10
Payer: MEDICARE

## 2023-02-10 PROCEDURE — 97110 THERAPEUTIC EXERCISES: CPT

## 2023-02-10 PROCEDURE — 97140 MANUAL THERAPY 1/> REGIONS: CPT

## 2023-02-10 PROCEDURE — 97530 THERAPEUTIC ACTIVITIES: CPT

## 2023-02-10 NOTE — PROGRESS NOTES
PT DAILY TREATMENT NOTE     Patient Name: Jake Villeda  Date:2/10/2023  : 1950  [x]  Patient  Verified  Payor: [employer] MEDICARE / Plan: [employer] / Product Type: Managed Care Medicare /    In time:7:35  Out time:8:13  Total Treatment Time (min): 38  Visit #: 29 of 40    Medicare/BCBS Only   Total Timed Codes (min):  38 1:1 Treatment Time:  38       Treatment Area: Pain in left knee [M25.562]    SUBJECTIVE  Pain Level (0-10 scale): 0  Any medication changes, allergies to medications, adverse drug reactions, diagnosis change, or new procedure performed?: [x] No    [] Yes (see summary sheet for update)  Subjective functional status/changes:   [] No changes reported  Pt states she went to a 3 day retreat and right leg became swollen and stiff    OBJECTIVE      20 min Therapeutic Exercise:  [x] See flow sheet :   Rationale: increase ROM and increase strength to improve the patients ability to perform ADLs    8 min Therapeutic Activity:  [x]  See flow sheet :   Rationale: increase strength, improve coordination, and increase proprioception  to improve the patients ability to increase ease with daily tasks       10 min Manual Therapy:  effleurage to decr right LE edema, tib-fem jt mobs, contract-relax for knee flex and ext   The manual therapy interventions were performed at a separate and distinct time from the therapeutic activities interventions.   Rationale: decrease pain, increase ROM, and increase tissue extensibility to improve functional mobility             With   [] TE   [] TA   [] neuro   [] other: Patient Education: [x] Review HEP    [] Progressed/Changed HEP based on:   [] positioning   [] body mechanics   [] transfers   [] heat/ice application    [] other:      Other Objective/Functional Measures:      Pain Level (0-10 scale) post treatment: 0    ASSESSMENT/Changes in Function: Patient arrived to PT with incr'd reports of lower leg swelling and knee stiffness after attending a 3 day retreat that required a lot of walking. Pt appt focused in decr'd edema and improved knee mobility. Min vc's for technique with sit to stand. PD TM today d/t swelling but will attempt next visit. Patient will continue to benefit from skilled PT services to modify and progress therapeutic interventions, address functional mobility deficits, address ROM deficits, address strength deficits, analyze and address soft tissue restrictions, analyze and cue movement patterns, analyze and modify body mechanics/ergonomics, assess and modify postural abnormalities, and address imbalance/dizziness to attain remaining goals. []  See Plan of Care  []  See progress note/recertification  []  See Discharge Summary         Progress towards goals / Updated goals:  1. Pt will demonstrate MMT Left Knee Flx at 5-/5 or better for good stability for progress to moderate community ambulation.                           Status at last note/certification:  MMT Left Knee Flx 4+-5- with no pain             Current Status:               2.  Pt will ambulate on TM for 1/4 mile or more for carryover to performing usual community ambulation with little to no difficulty                          Status at last note/certification:   Progressing at 500' with improved knee flx             Current Status:             3.  Pt will demonstrate AROM left knee Flx to 120* or better with little reports of stiffness for carryover to performing sit - stand from a standard height seat with little to no difficulty                          Status at last note/certification:  Progressing at AROM 121* after performing knee Flx/Ext exercises              Current Status: Progressing at AROM Knee Flx 121 progressing to 126* after treatment 2/6/23    PLAN  []  Upgrade activities as tolerated     [x]  Continue plan of care  []  Update interventions per flow sheet       []  Discharge due to:_  []  Other:_      Chloe Gaspar, PTA 2/10/2023  7:46 AM    No future appointments.

## 2023-02-14 ENCOUNTER — HOSPITAL ENCOUNTER (OUTPATIENT)
Facility: HOSPITAL | Age: 73
Setting detail: RECURRING SERIES
Discharge: HOME | End: 2023-02-17
Payer: MEDICARE

## 2023-02-14 PROCEDURE — 97110 THERAPEUTIC EXERCISES: CPT

## 2023-02-14 PROCEDURE — 97530 THERAPEUTIC ACTIVITIES: CPT

## 2023-02-14 NOTE — PROGRESS NOTES
PHYSICAL / OCCUPATIONAL THERAPY - DAILY TREATMENT NOTE (updated )    Patient Name: Ava Ortega    Date: 2023    : 1950  Insurance: Payor: Cande Quiles / Plan: [facility] MEDICARE COMPLETE / Product Type: *No Product type* /      Patient  verified Yes     Visit #   Current / Total 30 40   Time   In / Out [id number]   Pain   In / Out 0/10 0/10   Subjective Functional Status/Changes: No reports of pain, continued stiffness   Changes to:  Meds, Allergies, Med Hx, Sx Hx? If yes, update Summary List no       TREATMENT AREA =  Left knee pain [M25.562]    OBJECTIVE         Therapeutic Procedures: Tx Min Billable or 1:1 Min (if diff from Tx Min) Procedure, Rationale, Specifics   20  57732 Therapeutic Exercise (timed):  increase ROM, strength, coordination, balance, and proprioception to improve patient's ability to progress to PLOF and address remaining functional goals. (see flow sheet as applicable)     Details if applicable:       10 10 [id number] Therapeutic Activity (timed):  use of dynamic activities replicating functional movements to increase ROM, strength, coordination, balance, and proprioception in order to improve patient's ability to progress to PLOF and address remaining functional goals. (see flow sheet as applicable)     Details if applicable:     8 5 10753 Neuromuscular Re-Education (timed):  improve balance, coordination, kinesthetic sense, posture, core stability and proprioception to improve patient's ability to develop conscious control of individual muscles and awareness of position of extremities in order to progress to PLOF and address remaining functional goals.  (see flow sheet as applicable)     Details if applicable:            Details if applicable:            Details if applicable:     45 35 [address] Reminder: bill using total billable min of TIMED therapeutic procedures (example: do not include dry needle or estim unattended, both untimed codes, in totals to left)  8-22 min = 1 unit; 23-37 min = 2 units; 38-52 min = 3 units; 53-67 min = 4 units; 68-82 min = 5 units   Total Total     [x]  Patient Education billed concurrently with other procedures   [x] Review HEP    [] Progressed/Changed HEP, detail:    [] Other detail:       Objective Information/Functional Measures/Assessment    Patient tolerated treatment session well today. Patient had no complaints with addition of increased sets of step ups to exercise program to accomplish increased functional strength and mobility. Patient continues to make steady progress toward goals and would benefit from continued skilled PT intervention to address remaining deficits outlined in goals below. Patient will continue to benefit from skilled PT / OT services to modify and progress therapeutic interventions, analyze and address functional mobility deficits, analyze and address ROM deficits, analyze and address strength deficits, analyze and address soft tissue restrictions, analyze and cue for proper movement patterns, and analyze and modify for postural abnormalities to address functional deficits and attain remaining goals. Progress toward goals / Updated goals:  []  See Progress Note/Recertification    1. Pt will demonstrate MMT Left Knee Flx at 5-/5 or better for good stability for progress to moderate community ambulation.                           Status at last note/certification:  MMT Left Knee Flx 4+-5- with no pain             Current Status:               2.  Pt will ambulate on TM for 1/4 mile or more for carryover to performing usual community ambulation with little to no difficulty                          Status at last note/certification:   Progressing at 500' with improved knee flx             Current Status:             3.  Pt will demonstrate AROM left knee Flx to 120* or better with little reports of stiffness for carryover to performing sit - stand from a standard height seat with little to no difficulty Status at last note/certification:  Progressing at AROM 121* after performing knee Flx/Ext exercises              Current Status: Progressing at AROM Knee Flx 121 progressing to 126* after treatment 2/6/23    PLAN  Yes  Continue plan of care  []  Upgrade activities as tolerated  []  Discharge due to :  []  Other:    Nina Oviedo PTA    2/14/2023    7:11 AM    Future Appointments   Date Time Provider Destiney Robins   2/14/2023  9:30 AM Nina Oviedo PTA [address]   2/17/2023  9:30 AM Nina Oviedo PTA [address]   2/21/2023  9:30 AM Nina Oviedo PTA [address]   2/24/2023  9:00 AM Zaira Arrieta PT [address]   2/28/2023  9:30 AM Nina Oviedo PTA [address]

## 2023-02-17 ENCOUNTER — HOSPITAL ENCOUNTER (OUTPATIENT)
Facility: HOSPITAL | Age: 73
Setting detail: RECURRING SERIES
Discharge: HOME | End: 2023-02-20
Payer: MEDICARE

## 2023-02-17 PROCEDURE — 97110 THERAPEUTIC EXERCISES: CPT

## 2023-02-17 PROCEDURE — 97530 THERAPEUTIC ACTIVITIES: CPT

## 2023-02-17 NOTE — PROGRESS NOTES
PHYSICAL / OCCUPATIONAL THERAPY - DAILY TREATMENT NOTE (updated )    Patient Name: Jo Gibson    Date: 2023    : 1950  Insurance: Payor: [address] / Plan: [facility] MEDICARE COMPLETE / Product Type: *No Product type* /      Patient  verified Yes     Visit #   Current / Total 31 40   Time   In / Out 935 1013   Pain   In / Out 0/10 0/10 decreased stiffness    Subjective Functional Status/Changes: No pain, continued stiffness. Noted improved ambulation upon walking in   Changes to:  Meds, Allergies, Med Hx, Sx Hx? If yes, update Summary List no       TREATMENT AREA =  Left knee pain [M25.562]    OBJECTIVE         Therapeutic Procedures: Tx Min Billable or 1:1 Min (if diff from Tx Min) Procedure, Rationale, Specifics   26  10506 Therapeutic Exercise (timed):  increase ROM, strength, coordination, balance, and proprioception to improve patient's ability to progress to PLOF and address remaining functional goals. (see flow sheet as applicable)     Details if applicable:       12  26287 Therapeutic Activity (timed):  use of dynamic activities replicating functional movements to increase ROM, strength, coordination, balance, and proprioception in order to improve patient's ability to progress to PLOF and address remaining functional goals.   (see flow sheet as applicable)     Details if applicable:            Details if applicable:            Details if applicable:            Details if applicable:     45  [facility] Totals Reminder: bill using total billable min of TIMED therapeutic procedures (example: do not include dry needle or estim unattended, both untimed codes, in totals to left)  8-22 min = 1 unit; 23-37 min = 2 units; 38-52 min = 3 units; 53-67 min = 4 units; 68-82 min = 5 units   Total Total     [x]  Patient Education billed concurrently with other procedures   [x] Review HEP    [] Progressed/Changed HEP, detail:    [] Other detail:       Objective Information/Functional Measures/Assessment    Patient tolerated treatment session well today. Patient had no complaints with addition of Fwd step down with 6\" to exercise program to accomplish increased functional strength and mobility. Cues for form with step ups, patient putting excessive weight thru UE vs activating the muscle of the affected leg to ascend the step. Cues for pace throughout exercises to ensure adequate muscle activation. Patient amb 300' with little to no deviations in gait or reports of increased pain. Continued cues for encouragement throughout, patient often doubtful of progress and self limiting. Patient continues to make steady progress toward goals and would benefit from continued skilled PT intervention to address remaining deficits outlined in goals below. FOTO decreased to 63/100      Patient will continue to benefit from skilled PT / OT services to modify and progress therapeutic interventions, analyze and address functional mobility deficits, analyze and address ROM deficits, analyze and address strength deficits, analyze and address soft tissue restrictions, analyze and cue for proper movement patterns, and analyze and modify for postural abnormalities to address functional deficits and attain remaining goals. Progress toward goals / Updated goals:  []  See Progress Note/Recertification       1. Pt will demonstrate MMT Left Knee Flx at 5-/5 or better for good stability for progress to moderate community ambulation.                           Status at last note/certification:  MMT Left Knee Flx 4+-5- with no pain             Current Status:               2.  Pt will ambulate on TM for 1/4 mile or more for carryover to performing usual community ambulation with little to no difficulty                          Status at last note/certification:   Progressing at 500' with improved knee flx             Current Status: Amb 300' with little to no gait deviations, will assess TM at the next visit 2/17/23 3.  Pt will demonstrate AROM left knee Flx to 120* or better with little reports of stiffness for carryover to performing sit - stand from a standard height seat with little to no difficulty                          Status at last note/certification:  Progressing at AROM 121* after performing knee Flx/Ext exercises              Current Status: Progressing at AROM Knee Flx 121 progressing to 126* after treatment 2/6/23    PLAN  Yes  Continue plan of care  []  Upgrade activities as tolerated  []  Discharge due to :  []  Other:    Kenya Raya PTA    2/17/2023    7:06 AM    Future Appointments   Date Time Provider Destiney Robins   2/17/2023  9:30 AM Kenya Raya PTA [facility]   2/21/2023  9:30 AM Kenya Raya PTA [facility]   2/24/2023  9:00 AM Jarvis Valenzuela, PT [facility]   2/28/2023  9:30 AM [facility] PT [address] [facility]

## 2023-02-21 ENCOUNTER — HOSPITAL ENCOUNTER (OUTPATIENT)
Facility: HOSPITAL | Age: 73
Setting detail: RECURRING SERIES
Discharge: HOME | End: 2023-02-24
Payer: MEDICARE

## 2023-02-21 PROCEDURE — 97110 THERAPEUTIC EXERCISES: CPT

## 2023-02-21 PROCEDURE — 97530 THERAPEUTIC ACTIVITIES: CPT

## 2023-02-21 NOTE — PROGRESS NOTES
PHYSICAL / OCCUPATIONAL THERAPY - DAILY TREATMENT NOTE (updated )    Patient Name: Joselyn Chimera    Date: 2023    : 1950  Insurance: Payor: Alba Ro / Plan: [place] / Product Type: *No Product type* /      Patient  verified Yes     Visit #   Current / Total 32 40   Time   In / Out 930 1016   Pain   In / Out 0/10 0/10   Subjective Functional Status/Changes: \"I feel like the tinman, like I have a big metal screw going right thru the knee\"    Changes to:  Meds, Allergies, Med Hx, Sx Hx? If yes, update Summary List no       TREATMENT AREA =  Left knee pain [M25.562]    OBJECTIVE         Therapeutic Procedures: Tx Min Billable or 1:1 Min (if diff from Tx Min) Procedure, Rationale, Specifics   34 28 17852 Therapeutic Exercise (timed):  increase ROM, strength, coordination, balance, and proprioception to improve patient's ability to progress to PLOF and address remaining functional goals. (see flow sheet as applicable)     Details if applicable:       12 10 04131 Therapeutic Activity (timed):  use of dynamic activities replicating functional movements to increase ROM, strength, coordination, balance, and proprioception in order to improve patient's ability to progress to PLOF and address remaining functional goals.   (see flow sheet as applicable)     Details if applicable:            Details if applicable:            Details if applicable:            Details if applicable:     51 43 [facility] Totals Reminder: bill using total billable min of TIMED therapeutic procedures (example: do not include dry needle or estim unattended, both untimed codes, in totals to left)  8-22 min = 1 unit; 23-37 min = 2 units; 38-52 min = 3 units; 53-67 min = 4 units; 68-82 min = 5 units   Total Total     [x]  Patient Education billed concurrently with other procedures   [x] Review HEP    [] Progressed/Changed HEP, detail:    [] Other detail:       Objective Information/Functional Measures/Assessment    Patient tolerated treatment session well today. Patient had no complaints with exercise program to accomplish increased functional strength and mobility . MMT left knee flx 4+/5. Patient continues to make steady progress toward goals and would benefit from continued skilled PT intervention to address remaining deficits outlined in goals below. Patient will continue to benefit from skilled PT / OT services to modify and progress therapeutic interventions, analyze and address functional mobility deficits, analyze and address ROM deficits, analyze and address strength deficits, analyze and address soft tissue restrictions, analyze and cue for proper movement patterns, and analyze and modify for postural abnormalities to address functional deficits and attain remaining goals. Progress toward goals / Updated goals:  []  See Progress Note/Recertification    1. Pt will demonstrate MMT Left Knee Flx at 5-/5 or better for good stability for progress to moderate community ambulation. Status at last note/certification:  MMT Left Knee Flx 4+-5- with no pain             Current Status: MMT left knee flx 4+/5 2/21/23             2.  Pt will ambulate on TM for 1/4 mile or more for carryover to performing usual community ambulation with little to no difficulty                          Status at last note/certification:   Progressing at 500' with improved knee flx             Current Status: Amb 300' with little to no gait deviations, will assess TM at the next visit 2/17/23            3.   Pt will demonstrate AROM left knee Flx to 120* or better with little reports of stiffness for carryover to performing sit - stand from a standard height seat with little to no difficulty                          Status at last note/certification:  Progressing at AROM 121* after performing knee Flx/Ext exercises              Current Status: Progressing at AROM Knee Flx 121 progressing to 126* after treatment 2/6/23    PLAN  Yes  Continue plan of care  []  Upgrade activities as tolerated  []  Discharge due to :  []  Other:    Pierre Santiago PTA    2/21/2023    7:21 AM    Future Appointments   Date Time Provider Destiney Robins   2/21/2023  9:30 AM Pierre Santiago PTA [facility] [address]   2/24/2023  9:00 AM Vinicius Slade PT [facility] [address]   2/28/2023  9:30 AM Edith [address] PT [address] [facility] [address] [address]

## 2023-02-24 ENCOUNTER — HOSPITAL ENCOUNTER (OUTPATIENT)
Facility: HOSPITAL | Age: 73
Setting detail: RECURRING SERIES
Discharge: HOME | End: 2023-02-27
Payer: MEDICARE

## 2023-02-24 PROCEDURE — 97110 THERAPEUTIC EXERCISES: CPT

## 2023-02-24 PROCEDURE — 97530 THERAPEUTIC ACTIVITIES: CPT

## 2023-02-24 NOTE — PROGRESS NOTES
PHYSICAL / OCCUPATIONAL THERAPY - DAILY TREATMENT NOTE (updated )    Patient Name: Vera Cavazos    Date: 2023    : 1950  Insurance: Payor: Isaiah Fajardo / Plan: [place] / Product Type: *No Product type* /      Patient  verified Yes     Visit #   Current / Total 33 40   Time   In / Out 0905 1000   Pain   In / Out 0 0   Subjective Functional Status/Changes: Stiff   Changes to:  Meds, Allergies, Med Hx, Sx Hx? If yes, update Summary List no       TREATMENT AREA =  Left knee pain [M25.562]    OBJECTIVE         Therapeutic Procedures: Tx Min Billable or 1:1 Min (if diff from Tx Min) Procedure, Rationale, Specifics   30 9 78659 Therapeutic Exercise (timed):  increase ROM, strength, coordination, balance, and proprioception to improve patient's ability to progress to PLOF and address remaining functional goals. (see flow sheet as applicable)     Details if applicable:       25 20 19134 Therapeutic Activity (timed):  use of dynamic activities replicating functional movements to increase ROM, strength, coordination, balance, and proprioception in order to improve patient's ability to progress to PLOF and address remaining functional goals.   (see flow sheet as applicable)     Details if applicable:            Details if applicable:            Details if applicable:            Details if applicable:     48 27 [facility] Totals Reminder: bill using total billable min of TIMED therapeutic procedures (example: do not include dry needle or estim unattended, both untimed codes, in totals to left)  8-22 min = 1 unit; 23-37 min = 2 units; 38-52 min = 3 units; 53-67 min = 4 units; 68-82 min = 5 units   Total Total     [x]  Patient Education billed concurrently with other procedures   [x] Review HEP    [] Progressed/Changed HEP, detail:    [] Other detail:       Objective Information/Functional Measures/Assessment  - Pt arrived with a left antalgic gait with decreased left knee Flx  - Improved knee mobility after performing bike for 8'  - Pt demo good tolerance with sit - stand from a standard seat for 3x12 Reps  - Initiate TM for 1/8th mile       Patient will continue to benefit from skilled PT / OT services to modify and progress therapeutic interventions, analyze and address functional mobility deficits, analyze and address ROM deficits, analyze and address strength deficits, analyze and address soft tissue restrictions, and analyze and cue for proper movement patterns to address functional deficits and attain remaining goals. Progress toward goals / Updated goals:  []  See Progress Note/Recertification    1. Pt will demonstrate MMT Left Knee Flx at 5-/5 or better for good stability for progress to moderate community ambulation. Status at last note/certification:  MMT Left Knee Flx 4+-5- with no pain             Current Status: MMT left knee flx 4+/5 2/21/23             2.  Pt will ambulate on TM for 1/4 mile or more for carryover to performing usual community ambulation with little to no difficulty                          Status at last note/certification:   Progressing at 500' with improved knee flx             Current Status: Progressing at 1/8th mile in 5 min 2/24/23  3.   Pt will demonstrate AROM left knee Flx to 120* or better with little reports of stiffness for carryover to performing sit - stand from a standard height seat with little to no difficulty                          Status at last note/certification:  Progressing at AROM 121* after performing knee Flx/Ext exercises              Current Status: Progressing at AROM Knee Flx 121 progressing to 126* after treatment 2/6/23       PLAN  Yes  Continue plan of care  [x]  Upgrade activities as tolerated  []  Discharge due to :  []  Other:    Brendan Eldridge, PT    2/24/2023    4:29 PM    Future Appointments   Date Time Provider Destiney Robins   2/28/2023  9:30 AM [address] [facility] Unique Flap 1 Text: A SMAS flap was marked with reference to the zygomatic arch. The flap boundary was incised and the flap was raised over the parotid-masseteric fascia,being careful to avoid injury to the facial nerve and the parotid gland. A leaf of this flap was transposed to the mastoid region for suspension of the neck. The edges were then approximated with 2-0 PDS suture.

## 2023-02-28 ENCOUNTER — HOSPITAL ENCOUNTER (OUTPATIENT)
Facility: HOSPITAL | Age: 73
Setting detail: RECURRING SERIES
Discharge: HOME | End: 2023-03-03
Payer: MEDICARE

## 2023-02-28 PROCEDURE — 97110 THERAPEUTIC EXERCISES: CPT

## 2023-02-28 PROCEDURE — 97112 NEUROMUSCULAR REEDUCATION: CPT

## 2023-02-28 NOTE — PROGRESS NOTES
PHYSICAL / OCCUPATIONAL THERAPY - DAILY TREATMENT NOTE (updated )    Patient Name: Emperatriz Cevallos    Date: 2023    : 1950  Insurance: Payor: Gab Stevenson / Plan: [facility] MEDICARE COMPLETE / Product Type: *No Product type* /      Patient  verified Yes     Visit #   Current / Total 34 40   Time   In / Out 9:34 10:15   Pain   In / Out 0 0   Subjective Functional Status/Changes: \"It just feels stiff\"   Changes to:  Meds, Allergies, Med Hx, Sx Hx? If yes, update Summary List no       TREATMENT AREA =  Left knee pain [M25.562]    OBJECTIVE  Therapeutic Procedures: Tx Min Billable or 1:1 Min (if diff from Tx Min) Procedure, Rationale, Specifics   21 8 51611 Therapeutic Exercise (timed):  increase ROM, strength, coordination, balance, and proprioception to improve patient's ability to progress to PLOF and address remaining functional goals. (see flow sheet as applicable)     Details if applicable:       10  95220 Therapeutic Activity (timed):  use of dynamic activities replicating functional movements to increase ROM, strength, coordination, balance, and proprioception in order to improve patient's ability to progress to PLOF and address remaining functional goals. (see flow sheet as applicable)     Details if applicable:     10  67877 Neuromuscular Re-Education (timed):  improve balance, coordination, kinesthetic sense, posture, core stability and proprioception to improve patient's ability to develop conscious control of individual muscles and awareness of position of extremities in order to progress to PLOF and address remaining functional goals.  (see flow sheet as applicable)     Details if applicable:     [address] 33 [facility] Totals Reminder: bill using total billable min of TIMED therapeutic procedures (example: do not include dry needle or estim unattended, both untimed codes, in totals to left)  8-22 min = 1 unit; 23-37 min = 2 units; 38-52 min = 3 units; 53-67 min = 4 units; 68-82 min = 5 units   Total Total     [x]  Patient Education billed concurrently with other procedures   [x] Review HEP    [] Progressed/Changed HEP, detail:    [] Other detail:       Objective Information/Functional Measures/Assessment  Added lateral band walk  Added OTB hip x 3      Pt was challenged well with added therex and reports ms fatigue but denies incr'd pain. Challenged with lateral step ups d/t fatigue and req's intermittent rest breaks. Pt states descending stairs at home cont to be a problem. Performed ecc step down with 8 inch step void of pain today. Cont'd reports of knee stiffness. Patient will continue to benefit from skilled PT / OT services to modify and progress therapeutic interventions, analyze and address functional mobility deficits, analyze and address ROM deficits, analyze and address strength deficits, analyze and address soft tissue restrictions, analyze and cue for proper movement patterns, analyze and modify for postural abnormalities, and analyze and address imbalance/dizziness to address functional deficits and attain remaining goals. Progress toward goals / Updated goals:  []  See Progress Note/Recertification    1. Pt will demonstrate MMT Left Knee Flx at 5-/5 or better for good stability for progress to moderate community ambulation. Status at last note/certification:  MMT Left Knee Flx 4+-5- with no pain             Current Status: MMT left knee flx 4+/5 2/21/23             2.  Pt will ambulate on TM for 1/4 mile or more for carryover to performing usual community ambulation with little to no difficulty                          Status at last note/certification:   Progressing at 500' with improved knee flx             Current Status: Progressing at 1/8th mile in 5 min 2/24/23  3.   Pt will demonstrate AROM left knee Flx to 120* or better with little reports of stiffness for carryover to performing sit - stand from a standard height seat with little to no difficulty Status at last note/certification:  Progressing at AROM 121* after performing knee Flx/Ext exercises              Current Status: Progressing at AROM Knee Flx 121 progressing to 126* after treatment 2/6/23    PLAN  Yes  Continue plan of care  []  Upgrade activities as tolerated  []  Discharge due to :  []  Other:    Gabriella Morales PTA    2/28/2023    8:19 AM    Future Appointments   Date Time Provider Destiney Robins   2/28/2023  9:30 AM [address] [facility]

## 2023-03-03 ENCOUNTER — APPOINTMENT (OUTPATIENT)
Facility: HOSPITAL | Age: 73
End: 2023-03-03
Payer: MEDICARE

## 2023-03-07 ENCOUNTER — HOSPITAL ENCOUNTER (OUTPATIENT)
Facility: HOSPITAL | Age: 73
Setting detail: RECURRING SERIES
Discharge: HOME | End: 2023-03-10
Payer: MEDICARE

## 2023-03-07 PROCEDURE — 97110 THERAPEUTIC EXERCISES: CPT

## 2023-03-07 PROCEDURE — 97530 THERAPEUTIC ACTIVITIES: CPT

## 2023-03-07 PROCEDURE — 97112 NEUROMUSCULAR REEDUCATION: CPT

## 2023-03-07 NOTE — PROGRESS NOTES
PHYSICAL / OCCUPATIONAL THERAPY - DAILY TREATMENT NOTE (updated )    Patient Name: Dionte Carlos    Date: 3/7/2023    : 1950  Insurance: Payor: Anais Gross / Plan: [facility] MEDICARE COMPLETE / Product Type: *No Product type* /      Patient  verified Yes     Visit #   Current / Total 33 40   Time   In / Out 933 1011   Pain   In / Out 1/10 0/10   Subjective Functional Status/Changes: Reports some soreness d/t increased activity during the weekend    Changes to:  Meds, Allergies, Med Hx, Sx Hx? If yes, update Summary List no       TREATMENT AREA =  Left knee pain [M25.562]    OBJECTIVE    Therapeutic Procedures: Tx Min Billable or 1:1 Min (if diff from Tx Min) Procedure, Rationale, Specifics   20  26402 Therapeutic Exercise (timed):  increase ROM, strength, coordination, balance, and proprioception to improve patient's ability to progress to PLOF and address remaining functional goals. (see flow sheet as applicable)     Details if applicable:       8  01979 Neuromuscular Re-Education (timed):  improve balance, coordination, kinesthetic sense, posture, core stability and proprioception to improve patient's ability to develop conscious control of individual muscles and awareness of position of extremities in order to progress to PLOF and address remaining functional goals. (see flow sheet as applicable)     Details if applicable:     10  35452 Therapeutic Activity (timed):  use of dynamic activities replicating functional movements to increase ROM, strength, coordination, balance, and proprioception in order to improve patient's ability to progress to PLOF and address remaining functional goals.   (see flow sheet as applicable)     Details if applicable:            Details if applicable:            Details if applicable:     45  [facility] Totals Reminder: bill using total billable min of TIMED therapeutic procedures (example: do not include dry needle or estim unattended, both untimed codes, in totals to left)  8-22 min = 1 unit; 23-37 min = 2 units; 38-52 min = 3 units; 53-67 min = 4 units; 68-82 min = 5 units   Total Total     TOTAL TREATMENT TIME:        38     [x]  Patient Education billed concurrently with other procedures   [x] Review HEP    [] Progressed/Changed HEP, detail:    [] Other detail:       Objective Information/Functional Measures/Assessment    Patient tolerated treatment session well today. Patient had no complaints with exercise program to accomplish increased functional strength and mobility. Performed piriformis stretch in supine with towel d/t difficulty performing in seated. Reports occasional \"shooting\" pain in the leg when stepping down on the stairs. Practiced amb with hurdles to allow patient to increase flexion with ambulation. Proceeded to amb 200' . Patient continues to make steady progress toward goals and would benefit from continued skilled PT intervention to address remaining deficits outlined in goals below. Patient will continue to benefit from skilled PT / OT services to modify and progress therapeutic interventions, analyze and address functional mobility deficits, analyze and address ROM deficits, analyze and address strength deficits, analyze and address soft tissue restrictions, analyze and cue for proper movement patterns, and analyze and modify for postural abnormalities to address functional deficits and attain remaining goals. Progress toward goals / Updated goals:  []  See Progress Note/Recertification       1. Pt will demonstrate MMT Left Knee Flx at 5-/5 or better for good stability for progress to moderate community ambulation.                           Status at last note/certification:  MMT Left Knee Flx 4+-5- with no pain             Current Status: MMT left knee flx 4+/5 2/21/23             2.  Pt will ambulate on TM for 1/4 mile or more for carryover to performing usual community ambulation with little to no difficulty                          Status at last note/certification:   Progressing at 0' with improved knee flx             Current Status: Progressing at 1/8th mile in 5 min 2/24/23  3.   Pt will demonstrate AROM left knee Flx to 120* or better with little reports of stiffness for carryover to performing sit - stand from a standard height seat with little to no difficulty                          Status at last note/certification:  Progressing at AROM 121* after performing knee Flx/Ext exercises              Current Status: Progressing at AROM Knee Flx 121 progressing to 126* after treatment 2/6/23    PLAN  Yes  Continue plan of care  [x]  Upgrade activities as tolerated  []  Discharge due to :  []  Other:    Norman Campos PTA    3/7/2023    9:43 AM    Future Appointments   Date Time Provider Destiney Robins   3/10/2023  9:30 AM Norman Campos PTA [facility]   3/14/2023  9:30 AM [address] [facility]   3/17/2023  9:30 AM Norman Campos PTA [facility]   3/21/2023  9:00 AM Norman Campos PTA [facility]   3/24/2023  9:30 AM Norman Campos PTA [facility]   3/28/2023  9:30 AM Norman Campos PTA [facility]

## 2023-03-10 ENCOUNTER — HOSPITAL ENCOUNTER (OUTPATIENT)
Facility: HOSPITAL | Age: 73
Setting detail: RECURRING SERIES
Discharge: HOME | End: 2023-03-13
Payer: MEDICARE

## 2023-03-10 PROCEDURE — 97530 THERAPEUTIC ACTIVITIES: CPT

## 2023-03-10 PROCEDURE — 97110 THERAPEUTIC EXERCISES: CPT

## 2023-03-10 NOTE — PROGRESS NOTES
PHYSICAL / OCCUPATIONAL THERAPY - DAILY TREATMENT NOTE (updated )    Patient Name: Ana Maria Motta    Date: 3/10/2023    : 1950  Insurance: Payor: [address] / Plan: [facility] MEDICARE COMPLETE / Product Type: *No Product type* /      Patient  verified Yes     Visit #   Current / Total 34 40   Time   In / Out 0903 0957   Pain   In / Out 0 0   Subjective Functional Status/Changes: Still stiff and feel a grinding with stepping jacque stairs   Changes to:  Meds, Allergies, Med Hx, Sx Hx? If yes, update Summary List no       TREATMENT AREA =  Left knee pain [M25.562]    OBJECTIVE        Therapeutic Procedures: Tx Min Billable or 1:1 Min (if diff from Tx Min) Procedure, Rationale, Specifics   30  91322 Therapeutic Exercise (timed):  increase ROM, strength, coordination, balance, and proprioception to improve patient's ability to progress to PLOF and address remaining functional goals. (see flow sheet as applicable)     Details if applicable:         57216 Therapeutic Activity (timed):  use of dynamic activities replicating functional movements to increase ROM, strength, coordination, balance, and proprioception in order to improve patient's ability to progress to PLOF and address remaining functional goals.   (see flow sheet as applicable)     Details if applicable:             Details if applicable:            Details if applicable:            Details if applicable:     48  [facility] Totals Reminder: bill using total billable min of TIMED therapeutic procedures (example: do not include dry needle or estim unattended, both untimed codes, in totals to left)  8-22 min = 1 unit; 23-37 min = 2 units; 38-52 min = 3 units; 53-67 min = 4 units; 68-82 min = 5 units   Total Total     TOTAL TREATMENT TIME:        53     [x]  Patient Education billed concurrently with other procedures   [x] Review HEP    [] Progressed/Changed HEP, detail:    [] Other detail:       Objective Information/Functional Measures/Assessment    - AROM Left Knee Flx 129 Ext WNL  - MMT Left Knee Flx 4-4+, ext 5  - Good response to treatment with pt amb 500' with a fast pace and little to no antalgic gait  - Pt amb 1/4 mile on TM in 6' 5\" with no difficulty  - Stair negotiation 4x 4 6\" steps and 4x 4 6\" steps with carry of 5# dumbbells each hand          Patient will continue to benefit from skilled PT / OT services to modify and progress therapeutic interventions, analyze and address functional mobility deficits, analyze and address ROM deficits, analyze and address strength deficits, analyze and address soft tissue restrictions, and analyze and cue for proper movement patterns to address functional deficits and attain remaining goals. Progress toward goals / Updated goals:  []  See Progress Note/Recertification    1. Pt will demonstrate MMT Left Knee Flx at 5-/5 or better for good stability for progress to moderate community ambulation. Status at last note/certification:  MMT Left Knee Flx 4+-5- with no pain             Current Status: MMT left knee flx 4-4+/5 3/10/23             2.  Pt will ambulate on TM for 1/4 mile or more for carryover to performing usual community ambulation with little to no difficulty                          Status at last note/certification:   Progressing at 500' with improved knee flx             Current Status: Progressing at 1/4 mile in 6' 5\" min 3/10/23  3.   Pt will demonstrate AROM left knee Flx to 120* or better with little reports of stiffness for carryover to performing sit - stand from a standard height seat with little to no difficulty                          Status at last note/certification:  Progressing at AROM 121* after performing knee Flx/Ext exercises              Current Status: Met with AROM Left Knee Flx 129* with continued reports of stiffness 3/10/23       PLAN  Yes  Continue plan of care  [x]  Upgrade activities as tolerated  []  Discharge due to :  []  Other:    Anna Alanis PT    3/10/2023    9:29 AM    Future Appointments   Date Time Provider Destiney Sousai   3/14/2023  9:30 AM [facility]   3/17/2023  9:30 AM Joanna Bartlett PTA [facility]   3/21/2023  9:00 AM Joanna Bartlett PTA [facility]   3/24/2023  9:30 AM Joanna Bartlett PTA [facility]   3/28/2023 11:00 AM Duglas Powers, PT [facility] H[facility]

## 2023-03-14 ENCOUNTER — HOSPITAL ENCOUNTER (OUTPATIENT)
Facility: HOSPITAL | Age: 73
Setting detail: RECURRING SERIES
Discharge: HOME | End: 2023-03-17
Payer: MEDICARE

## 2023-03-14 PROCEDURE — 97112 NEUROMUSCULAR REEDUCATION: CPT

## 2023-03-14 PROCEDURE — 97110 THERAPEUTIC EXERCISES: CPT

## 2023-03-14 NOTE — PROGRESS NOTES
PHYSICAL / OCCUPATIONAL THERAPY - DAILY TREATMENT NOTE (updated )    Patient Name: Son Landry    Date: 3/14/2023    : 1950  Insurance: Payor: [address] / Plan: [facility] MEDICARE COMPLETE / Product Type: *No Product type* /      Patient  verified Yes     Visit #   Current / Total 35 40   Time   In / Out 9:35 10:08   Pain   In / Out 0 0   Subjective Functional Status/Changes: No pain, just tight   Changes to:  Meds, Allergies, Med Hx, Sx Hx? If yes, update Summary List no       TREATMENT AREA =  Left knee pain [M25.562]    OBJECTIVE      Therapeutic Procedures: Tx Min Billable or 1:1 Min (if diff from Tx Min) Procedure, Rationale, Specifics   23  52311 Therapeutic Exercise (timed):  increase ROM, strength, coordination, balance, and proprioception to improve patient's ability to progress to PLOF and address remaining functional goals. (see flow sheet as applicable)     Details if applicable:       10  66392 Neuromuscular Re-Education (timed):  improve balance, coordination, kinesthetic sense, posture, core stability and proprioception to improve patient's ability to develop conscious control of individual muscles and awareness of position of extremities in order to progress to PLOF and address remaining functional goals. (see flow sheet as applicable)     Details if applicable:     35 30 [facility] Totals Reminder: bill using total billable min of TIMED therapeutic procedures (example: do not include dry needle or estim unattended, both untimed codes, in totals to left)  8-22 min = 1 unit; 23-37 min = 2 units; 38-52 min = 3 units; 53-67 min = 4 units; 68-82 min = 5 units   Total Total     TOTAL TREATMENT TIME:        33     [x]  Patient Education billed concurrently with other procedures   [x] Review HEP    [] Progressed/Changed HEP, detail:    [] Other detail:       Objective Information/Functional Measures/Assessment    Added Leg Press, lateral amb, and slow march per flow sheet.  Demo's good tolerance to therex although fatigues easily and req's intermittent rest breaks. Performed treatment void of pain. Patient will continue to benefit from skilled PT / OT services to modify and progress therapeutic interventions, analyze and address functional mobility deficits, analyze and address ROM deficits, analyze and address strength deficits, analyze and address soft tissue restrictions, analyze and cue for proper movement patterns, and analyze and address imbalance/dizziness to address functional deficits and attain remaining goals. Progress toward goals / Updated goals:  []  See Progress Note/Recertification    1. Pt will demonstrate MMT Left Knee Flx at 5-/5 or better for good stability for progress to moderate community ambulation. Status at last note/certification:  MMT Left Knee Flx 4+-5- with no pain             Current Status: MMT left knee flx 4-4+/5 3/10/23             2.  Pt will ambulate on TM for 1/4 mile or more for carryover to performing usual community ambulation with little to no difficulty                          Status at last note/certification:   Progressing at 500' with improved knee flx             Current Status: Progressing at 1/4 mile in 6' 5\" min 3/10/23  3.   Pt will demonstrate AROM left knee Flx to 120* or better with little reports of stiffness for carryover to performing sit - stand from a standard height seat with little to no difficulty                          Status at last note/certification:  Progressing at AROM 121* after performing knee Flx/Ext exercises              Current Status: Met with AROM Left Knee Flx 129* with continued reports of stiffness 3/10/23       PLAN  Yes  Continue plan of care  []  Upgrade activities as tolerated  []  Discharge due to :  []  Other:    Efrain Norman PTA    3/14/2023    8:09 AM    Future Appointments   Date Time Provider Destiney Robins   3/14/2023  9:30 AM Efrain Norman PTA [place] [facility]   3/21/2023 9:00 AM Jakob Caro PTA [facility]   3/24/2023  9:30 AM Jakob Caro PTA [facility]   3/28/2023 11:00 AM Yeimi Arias, SOLEDAD Shay

## 2023-03-17 ENCOUNTER — APPOINTMENT (OUTPATIENT)
Facility: HOSPITAL | Age: 73
End: 2023-03-17
Payer: MEDICARE

## 2023-03-20 NOTE — PROGRESS NOTES
[x] Review HEP    [] Progressed/Changed HEP, detail:    [] Other detail:       Objective Information/Functional Measures/Assessment    Patient tolerated treatment session well today. Patient had no complaints with exercise program to accomplish increased functional strength and mobility. VC for form with Leg press to ensure proper muscle activation. Patient continues to make steady progress toward goals and would benefit from continued skilled PT intervention to address remaining deficits outlined in goals below. Patient has shown good progress with this treatment program. Pain as of last visit was 0/10. Patient has shown decreased pain and increased strength and mobility. However, continues to report stiffness and a feeling of \"cement\" or \"gravel\" in the knee. States she feels she is able to do whatever she wants, but the stiffness in the knee increases with ambulation. Patient reports 90-95% improvement with overall involvement. FOTO score is 77. Patient will continue to benefit from skilled PT / OT services to modify and progress therapeutic interventions, analyze and address functional mobility deficits, analyze and address ROM deficits, analyze and address strength deficits, analyze and address soft tissue restrictions, analyze and cue for proper movement patterns, and analyze and modify for postural abnormalities to address functional deficits and attain remaining goals. Progress toward goals / Updated goals:  []  See Progress Note/Recertification    1. Pt will demonstrate MMT Left Knee Flx at 5-/5 or better for good stability for progress to moderate community ambulation.                           Status at last note/certification:  MMT Left Knee Flx 4+-5- with no pain             Current Status: MMT left knee flx 4-4+/5 3/10/23             2.  Pt will ambulate on TM for 1/4 mile or more for carryover to performing usual community ambulation with little to no difficulty

## 2023-03-21 ENCOUNTER — HOSPITAL ENCOUNTER (OUTPATIENT)
Facility: HOSPITAL | Age: 73
Setting detail: RECURRING SERIES
Discharge: HOME | End: 2023-03-24
Payer: MEDICARE

## 2023-03-21 PROCEDURE — 97110 THERAPEUTIC EXERCISES: CPT

## 2023-03-21 PROCEDURE — 97530 THERAPEUTIC ACTIVITIES: CPT

## 2023-03-21 NOTE — PROGRESS NOTES
has shown good progress with this treatment program. Pain as of last visit was 0/10. Patient has shown decreased pain and increased strength and mobility. However, continues to report stiffness and a feeling of \"cement\" or \"gravel\" in the knee. States she feels she is able to do whatever she wants, but the stiffness in the knee increases with ambulation. Patient reports 90-95% improvement with overall involvement. FOTO score is 77. Patient ready for DC after remaining scheduled visits       ASSESSMENT/RECOMMENDATIONS:    Continue per plan of care.        Thank you for this referral.   Elgin Russ, PTA 3/21/2023 9:46 AM  Kam Falcon, PT 3/22/2023

## 2023-03-24 ENCOUNTER — APPOINTMENT (OUTPATIENT)
Facility: HOSPITAL | Age: 73
End: 2023-03-24
Payer: MEDICARE

## 2023-03-28 ENCOUNTER — HOSPITAL ENCOUNTER (OUTPATIENT)
Facility: HOSPITAL | Age: 73
Setting detail: RECURRING SERIES
Discharge: HOME | End: 2023-03-31
Payer: MEDICARE

## 2023-03-28 PROCEDURE — 97110 THERAPEUTIC EXERCISES: CPT

## 2023-03-28 NOTE — PROGRESS NOTES
PT DISCHARGE DAILY NOTE AND SUMMARY     Date:3/28/2023  Patient name: Fay Collins Start of Care: 2022   Referral source: Amber Chávez MD : 1950               Medical Diagnosis: Pain in left knee [M25.562]  Payor: [address] [place] [address],9D / Plan: [address] [employer] / Product Type: Managed Care Medicare /  Onset Date:22               Treatment Diagnosis: Left Knee Pain   Prior Hospitalization: see medical history Provider#: [id number]   Medications: Verified on Patient summary List   Comorbidities: DM, OA,  Pacemaker, HTN, Hearing Impaired  Prior Level of Function: Frequent left knee pain     Visits from Start of Care: 38                                                Missed Visits: 3  Insurance: Payor: [employer] Covert / Plan: [employer] / Product Type: *No Product type* /      Visits from Start of Care: 39   Missed Visits: 0    Reporting Period : 3/21/23 to 3/28/23    Patient  verified yes     Visit #   Current / Total 37 40   Time   In / Out 1100 1145   Pain   In / Out 0 0   Subjective Functional Status/Changes: Pt reports 100% improvement with overall invlovement. Just feels a little stiffness. Able to cut her yard with no difficulty   Changes to:  Meds, Allergies, Med Hx, Sx Hx? If yes, update Summary List no       TREATMENT AREA =  Left knee pain [M25.562]    OBJECTIVE         Therapeutic Procedures: Tx Min Billable or 1:1 Min (if diff from Tx Min) Procedure, Rationale, Specifics   40  03771 Therapeutic Exercise (timed):  increase ROM, strength, coordination, balance, and proprioception to improve patient's ability to progress to PLOF and address remaining functional goals.  (see flow sheet as applicable)     Details if applicable:       5  94402 Therapeutic Activity (timed):  use of dynamic activities replicating functional movements to increase ROM, strength, coordination, balance, and proprioception in order to improve patient's ability to

## 2023-03-28 NOTE — PROGRESS NOTES
Physical Therapy Discharge Instructions      [facility] [address] [facility] [address] [address],[address]  Phone: [phone]      Fax:  [phone]    Patient: Zachery Weinberg  : 1950      Continue Home Exercise Program 3 times a week     Continue with    [x] Ice  as needed      [] Heat           Follow up with MD:     [] Upon completion of therapy     [x] As needed      Recommendations:     [x]   Return to activity with home program    []   Return to activity with the following modifications:       []Post Rehab Program    []Join Independent aquatic program     []Return to/join local gym        Additional Comments:  Thank you for allow us to aid with you rehabilitation        Micaela Giang, PT 3/28/2023 11:32 AM

## 2024-06-17 ENCOUNTER — OFFICE VISIT (OUTPATIENT)
Age: 74
End: 2024-06-17
Payer: MEDICARE

## 2024-06-17 VITALS
RESPIRATION RATE: 16 BRPM | SYSTOLIC BLOOD PRESSURE: 134 MMHG | HEIGHT: 66 IN | HEART RATE: 68 BPM | OXYGEN SATURATION: 96 % | TEMPERATURE: 97.6 F | WEIGHT: 196.4 LBS | BODY MASS INDEX: 31.57 KG/M2 | DIASTOLIC BLOOD PRESSURE: 74 MMHG

## 2024-06-17 DIAGNOSIS — I44.1 AV BLOCK, 2ND DEGREE: ICD-10-CM

## 2024-06-17 DIAGNOSIS — R94.31 ABNORMAL ECG: ICD-10-CM

## 2024-06-17 DIAGNOSIS — I51.89 DIASTOLIC DYSFUNCTION: ICD-10-CM

## 2024-06-17 DIAGNOSIS — R01.1 SYSTOLIC MURMUR: ICD-10-CM

## 2024-06-17 DIAGNOSIS — Z95.0 PACEMAKER: ICD-10-CM

## 2024-06-17 DIAGNOSIS — I1A.0 RESISTANT HYPERTENSION: Primary | ICD-10-CM

## 2024-06-17 PROCEDURE — 93280 PM DEVICE PROGR EVAL DUAL: CPT | Performed by: INTERNAL MEDICINE

## 2024-06-17 PROCEDURE — 1123F ACP DISCUSS/DSCN MKR DOCD: CPT | Performed by: INTERNAL MEDICINE

## 2024-06-17 PROCEDURE — G8417 CALC BMI ABV UP PARAM F/U: HCPCS | Performed by: INTERNAL MEDICINE

## 2024-06-17 PROCEDURE — 1036F TOBACCO NON-USER: CPT | Performed by: INTERNAL MEDICINE

## 2024-06-17 PROCEDURE — 99214 OFFICE O/P EST MOD 30 MIN: CPT | Performed by: INTERNAL MEDICINE

## 2024-06-17 PROCEDURE — 3017F COLORECTAL CA SCREEN DOC REV: CPT | Performed by: INTERNAL MEDICINE

## 2024-06-17 PROCEDURE — G8400 PT W/DXA NO RESULTS DOC: HCPCS | Performed by: INTERNAL MEDICINE

## 2024-06-17 PROCEDURE — G8427 DOCREV CUR MEDS BY ELIG CLIN: HCPCS | Performed by: INTERNAL MEDICINE

## 2024-06-17 PROCEDURE — 3078F DIAST BP <80 MM HG: CPT | Performed by: INTERNAL MEDICINE

## 2024-06-17 PROCEDURE — 3075F SYST BP GE 130 - 139MM HG: CPT | Performed by: INTERNAL MEDICINE

## 2024-06-17 PROCEDURE — 1090F PRES/ABSN URINE INCON ASSESS: CPT | Performed by: INTERNAL MEDICINE

## 2024-06-17 RX ORDER — KRILL/OM-3/DHA/EPA/PHOSPHO/AST 500-110 MG
CAPSULE ORAL
COMMUNITY
Start: 2020-03-01

## 2024-06-17 RX ORDER — EZETIMIBE 10 MG/1
10 TABLET ORAL DAILY
COMMUNITY
Start: 2024-02-02

## 2024-06-17 RX ORDER — VIT A/VIT C/VIT E/ZINC/COPPER 4296-226
CAPSULE ORAL
COMMUNITY

## 2024-06-17 ASSESSMENT — ENCOUNTER SYMPTOMS
ALLERGIC/IMMUNOLOGIC NEGATIVE: 1
EYES NEGATIVE: 1
GASTROINTESTINAL NEGATIVE: 1
SHORTNESS OF BREATH: 0

## 2024-06-17 ASSESSMENT — LIFESTYLE VARIABLES: HOW MANY STANDARD DRINKS CONTAINING ALCOHOL DO YOU HAVE ON A TYPICAL DAY: PATIENT DOES NOT DRINK

## 2024-06-17 NOTE — PROGRESS NOTES
1. \"Have you been to the ER, urgent care clinic since your last visit?  Hospitalized since your last visit?\" Reviewed by Dr. Aneesh Avelar    2. \"Have you seen or consulted any other health care providers outside of the [facility] since your last visit?\" Reviewed by Dr. Aneesh Avelar

## 2024-06-17 NOTE — PROGRESS NOTES
Shari Stoner (:  1950) is a 74 y.o. female,Established patient, here for evaluation of the following chief complaint(s):  Follow-up (6-Month f/u with device check: ECHO)    Subjective   SUBJECTIVE/OBJECTIVE:  HPI  Patient presents today for follow-up. She has a history of intermittent heart block and is status post pacemaker insertion. She also has hypertension and hyperlipidemia.           Today, she is doing relatively well but has more of an issue with her back and knee pain.  Otherwise quite stable. No chest discomfort. No shortness of breath. No palpitations. No orthopnea. No history of known coronary disease or systolic heart failure.           I have personally reviewed all available medical records, office notes, radiology and lab reports.    Past Medical History:   Diagnosis Date    High cholesterol     Hypertension     Osteoporosis     Pacemaker     [employer] dual chamber pacemaker        Past Surgical History:   Procedure Laterality Date    CARDIAC PACEMAKER PLACEMENT      [employer] dual chamber pacemaker        Allergies   Allergen Reactions    Latex Rash    Naproxen Sodium Anaphylaxis    Penicillins Rash and Shortness Of Breath    Nickel Rash    Sulfa Antibiotics Rash        Current Outpatient Medications   Medication Sig Dispense Refill    Multiple Vitamins-Minerals (PRESERVISION AREDS) CAPS as directed.      ezetimibe (ZETIA) 10 MG tablet Take 1 tablet by mouth daily      Krill Oil (OMEGA-3) 500 MG CAPS as directed Orally once a day, OTC      Collagen-Vitamin C-Biotin 500-50-0.8 MG CAPS Take by mouth      celecoxib (CELEBREX) 200 MG capsule 1 capsule with food      vitamin D3 (CHOLECALCIFEROL) 125 MCG (5000 UT) TABS tablet Take 1 tablet by mouth daily      irbesartan-hydroCHLOROthiazide (AVALIDE) 150-12.5 MG per tablet Take 1 tablet by mouth daily      Lutein-Zeaxanthin 20-1 MG CAPS Take 1 capsule by mouth daily      pantoprazole (PROTONIX) 40 MG tablet 1 tablet      zinc

## 2024-12-17 ENCOUNTER — OFFICE VISIT (OUTPATIENT)
Age: 74
End: 2024-12-17
Payer: MEDICARE

## 2024-12-17 VITALS
OXYGEN SATURATION: 99 % | HEART RATE: 77 BPM | TEMPERATURE: 95.2 F | HEIGHT: 66 IN | SYSTOLIC BLOOD PRESSURE: 145 MMHG | BODY MASS INDEX: 31.02 KG/M2 | DIASTOLIC BLOOD PRESSURE: 92 MMHG | WEIGHT: 193 LBS

## 2024-12-17 DIAGNOSIS — I44.1 AV BLOCK, 2ND DEGREE: ICD-10-CM

## 2024-12-17 DIAGNOSIS — I51.89 DIASTOLIC DYSFUNCTION: ICD-10-CM

## 2024-12-17 DIAGNOSIS — R94.31 ABNORMAL ECG: ICD-10-CM

## 2024-12-17 DIAGNOSIS — Z95.0 PACEMAKER: ICD-10-CM

## 2024-12-17 DIAGNOSIS — I1A.0 RESISTANT HYPERTENSION: Primary | ICD-10-CM

## 2024-12-17 DIAGNOSIS — R01.1 SYSTOLIC MURMUR: ICD-10-CM

## 2024-12-17 PROCEDURE — G8400 PT W/DXA NO RESULTS DOC: HCPCS | Performed by: INTERNAL MEDICINE

## 2024-12-17 PROCEDURE — 1126F AMNT PAIN NOTED NONE PRSNT: CPT | Performed by: INTERNAL MEDICINE

## 2024-12-17 PROCEDURE — 3077F SYST BP >= 140 MM HG: CPT | Performed by: INTERNAL MEDICINE

## 2024-12-17 PROCEDURE — G8484 FLU IMMUNIZE NO ADMIN: HCPCS | Performed by: INTERNAL MEDICINE

## 2024-12-17 PROCEDURE — G8427 DOCREV CUR MEDS BY ELIG CLIN: HCPCS | Performed by: INTERNAL MEDICINE

## 2024-12-17 PROCEDURE — 1090F PRES/ABSN URINE INCON ASSESS: CPT | Performed by: INTERNAL MEDICINE

## 2024-12-17 PROCEDURE — 1159F MED LIST DOCD IN RCRD: CPT | Performed by: INTERNAL MEDICINE

## 2024-12-17 PROCEDURE — 1123F ACP DISCUSS/DSCN MKR DOCD: CPT | Performed by: INTERNAL MEDICINE

## 2024-12-17 PROCEDURE — 99214 OFFICE O/P EST MOD 30 MIN: CPT | Performed by: INTERNAL MEDICINE

## 2024-12-17 PROCEDURE — 1036F TOBACCO NON-USER: CPT | Performed by: INTERNAL MEDICINE

## 2024-12-17 PROCEDURE — 3017F COLORECTAL CA SCREEN DOC REV: CPT | Performed by: INTERNAL MEDICINE

## 2024-12-17 PROCEDURE — 3080F DIAST BP >= 90 MM HG: CPT | Performed by: INTERNAL MEDICINE

## 2024-12-17 PROCEDURE — G8417 CALC BMI ABV UP PARAM F/U: HCPCS | Performed by: INTERNAL MEDICINE

## 2024-12-17 ASSESSMENT — ENCOUNTER SYMPTOMS
SHORTNESS OF BREATH: 0
EYES NEGATIVE: 1
GASTROINTESTINAL NEGATIVE: 1
ALLERGIC/IMMUNOLOGIC NEGATIVE: 1

## 2024-12-17 ASSESSMENT — PATIENT HEALTH QUESTIONNAIRE - PHQ9
1. LITTLE INTEREST OR PLEASURE IN DOING THINGS: NOT AT ALL
SUM OF ALL RESPONSES TO PHQ QUESTIONS 1-9: 0
SUM OF ALL RESPONSES TO PHQ9 QUESTIONS 1 & 2: 0
2. FEELING DOWN, DEPRESSED OR HOPELESS: NOT AT ALL

## 2024-12-17 NOTE — PROGRESS NOTES
1. \"Have you been to the ER, urgent care clinic since your last visit?  Hospitalized since your last visit?\" Reviewed by Dr. Aneesh Avelar     2. \"Have you seen or consulted any other health care providers outside of the [facility] since your last visit?\" Reviewed by Dr. Aneesh Avelar    
pressure is mildly elevated.      ASSESSMENT/PLAN:   Assessment & Plan  1. Elevated blood pressure.  Her blood pressure is mildly elevated during this visit. She has been advised to monitor her blood pressure at home at least twice a week, once in the morning and once in the evening, and to communicate these readings via text message.    PROCEDURE  Dual chamber [employer] pacemaker was interrogated. Adequate sensing and pacing thresholds were noted. No arrhythmias noted. Battery status is within acceptable range.    Results  Testing  Dual chamber [employer] pacemaker was interrogated. Adequate sensing and pacing thresholds were noted. No arrhythmias noted. Battery status is within acceptable range.    1. Resistant hypertension  2. Diastolic dysfunction  3. Pacemaker  4. AV block, 2nd degree  5. Abnormal ECG  6. Systolic murmur    No results found for any visits on 12/17/24.     Return in about 6 months (around 6/17/2025) for follow up with device.    On this date 12/17/2024 I have spent 36 minutes reviewing previous notes, test results and face to face with the patient discussing the diagnosis and importance of compliance with the treatment plan as well as documenting on the day of the visit.    The patient (or guardian, if applicable) and other individuals in attendance with the patient were advised that Artificial Intelligence will be utilized during this visit to record, process the conversation to generate a clinical note and to support improvement of the AI technology. The patient (or guardian, if applicable) and other individuals in attendance at the appointment consented to the use of AI, including the recording.       An electronic signature was used to authenticate this note.    --Aneesh Avelar MD

## 2025-01-13 DIAGNOSIS — I51.89 DIASTOLIC DYSFUNCTION: Primary | ICD-10-CM

## 2025-01-13 DIAGNOSIS — R01.1 SYSTOLIC MURMUR: ICD-10-CM

## 2025-02-27 NOTE — TELEPHONE ENCOUNTER
PCP: Stephon Pierce MD    Last appt:  12/17/2024   Future Appointments   Date Time Provider Department Center   6/3/2025 11:00 AM [facility]   8/6/2025 10:00 AM Aneesh Avelar Sr., MD [facility]       Requested Prescriptions     Pending Prescriptions Disp Refills    irbesartan-hydroCHLOROthiazide (AVALIDE) 150-12.5 MG per tablet 180 tablet 3     Sig: Take 1 tablet by mouth 2 times daily       Request for a 90 day supply? Provider Discretion    Pharmacy: [facility]    Other Comments: Patient said she use to take Irbesartan-Hctz 300-12.5 mg and she was having problems with it. She stated that in a message with Valentino Keith he changed her to Irbesartan Hctz 150-12.5, which was not enough and she was instructed to take medication BID, which she has been doing so she ran out of medication sooner and is requesting a refill on the new frequency.   Chart reflects DAILY, but refill put in for BID

## 2025-02-28 RX ORDER — IRBESARTAN AND HYDROCHLOROTHIAZIDE 150; 12.5 MG/1; MG/1
1 TABLET, FILM COATED ORAL 2 TIMES DAILY
Qty: 180 TABLET | Refills: 3 | Status: SHIPPED | OUTPATIENT
Start: 2025-02-28 | End: 2025-02-28

## 2025-02-28 RX ORDER — IRBESARTAN AND HYDROCHLOROTHIAZIDE 150; 12.5 MG/1; MG/1
1 TABLET, FILM COATED ORAL DAILY
Qty: 90 TABLET | Refills: 3 | Status: SHIPPED | OUTPATIENT
Start: 2025-02-28

## 2025-03-04 RX ORDER — IRBESARTAN AND HYDROCHLOROTHIAZIDE 150; 12.5 MG/1; MG/1
1 TABLET, FILM COATED ORAL 2 TIMES DAILY
Qty: 180 TABLET | Refills: 3 | Status: SHIPPED | OUTPATIENT
Start: 2025-03-04

## 2025-03-04 NOTE — TELEPHONE ENCOUNTER
PCP: Stephon Pierce MD    Last appt:  12/17/2024   Future Appointments   Date Time Provider Department Center   6/3/2025 11:00 AM [facility]   8/6/2025 10:00 AM Aneesh Avelar Sr., MD [facility]       Requested Prescriptions     Pending Prescriptions Disp Refills    irbesartan-hydroCHLOROthiazide (AVALIDE) 150-12.5 MG per tablet 180 tablet 3     Sig: Take 1 tablet by mouth in the morning and at bedtime       Request for a 30 or 90 day supply? Provider Discretion    Pharmacy: Confirmed by patient    Other Comments:N/A

## 2025-06-03 ENCOUNTER — CLINICAL SUPPORT (OUTPATIENT)
Age: 75
End: 2025-06-03
Payer: MEDICARE

## 2025-06-03 DIAGNOSIS — I44.1 AV BLOCK, 2ND DEGREE: ICD-10-CM

## 2025-06-03 DIAGNOSIS — Z95.0 PACEMAKER: Primary | ICD-10-CM

## 2025-06-03 PROCEDURE — 93280 PM DEVICE PROGR EVAL DUAL: CPT | Performed by: INTERNAL MEDICINE

## 2025-08-06 ENCOUNTER — OFFICE VISIT (OUTPATIENT)
Age: 75
End: 2025-08-06
Payer: MEDICARE

## 2025-08-06 VITALS
WEIGHT: 193 LBS | OXYGEN SATURATION: 97 % | HEART RATE: 62 BPM | SYSTOLIC BLOOD PRESSURE: 150 MMHG | DIASTOLIC BLOOD PRESSURE: 80 MMHG | HEIGHT: 66 IN | BODY MASS INDEX: 31.02 KG/M2

## 2025-08-06 DIAGNOSIS — Z95.0 PACEMAKER: ICD-10-CM

## 2025-08-06 DIAGNOSIS — R94.31 ABNORMAL ECG: ICD-10-CM

## 2025-08-06 DIAGNOSIS — I44.1 AV BLOCK, 2ND DEGREE: ICD-10-CM

## 2025-08-06 DIAGNOSIS — I51.89 DIASTOLIC DYSFUNCTION: ICD-10-CM

## 2025-08-06 DIAGNOSIS — R01.1 SYSTOLIC MURMUR: ICD-10-CM

## 2025-08-06 DIAGNOSIS — I1A.0 RESISTANT HYPERTENSION: Primary | ICD-10-CM

## 2025-08-06 PROCEDURE — 3017F COLORECTAL CA SCREEN DOC REV: CPT | Performed by: INTERNAL MEDICINE

## 2025-08-06 PROCEDURE — G8400 PT W/DXA NO RESULTS DOC: HCPCS | Performed by: INTERNAL MEDICINE

## 2025-08-06 PROCEDURE — 1160F RVW MEDS BY RX/DR IN RCRD: CPT | Performed by: INTERNAL MEDICINE

## 2025-08-06 PROCEDURE — 1090F PRES/ABSN URINE INCON ASSESS: CPT | Performed by: INTERNAL MEDICINE

## 2025-08-06 PROCEDURE — 3077F SYST BP >= 140 MM HG: CPT | Performed by: INTERNAL MEDICINE

## 2025-08-06 PROCEDURE — G8417 CALC BMI ABV UP PARAM F/U: HCPCS | Performed by: INTERNAL MEDICINE

## 2025-08-06 PROCEDURE — 1159F MED LIST DOCD IN RCRD: CPT | Performed by: INTERNAL MEDICINE

## 2025-08-06 PROCEDURE — 1123F ACP DISCUSS/DSCN MKR DOCD: CPT | Performed by: INTERNAL MEDICINE

## 2025-08-06 PROCEDURE — G8427 DOCREV CUR MEDS BY ELIG CLIN: HCPCS | Performed by: INTERNAL MEDICINE

## 2025-08-06 PROCEDURE — 1036F TOBACCO NON-USER: CPT | Performed by: INTERNAL MEDICINE

## 2025-08-06 PROCEDURE — 3078F DIAST BP <80 MM HG: CPT | Performed by: INTERNAL MEDICINE

## 2025-08-06 PROCEDURE — 1126F AMNT PAIN NOTED NONE PRSNT: CPT | Performed by: INTERNAL MEDICINE

## 2025-08-06 PROCEDURE — 99215 OFFICE O/P EST HI 40 MIN: CPT | Performed by: INTERNAL MEDICINE

## 2025-08-06 RX ORDER — ATORVASTATIN CALCIUM 40 MG/1
40 TABLET, FILM COATED ORAL
COMMUNITY
Start: 2025-06-19

## 2025-08-06 RX ORDER — SIMVASTATIN 20 MG
20 TABLET ORAL EVERY EVENING
COMMUNITY

## 2025-08-06 RX ORDER — LOSARTAN POTASSIUM 100 MG/1
100 TABLET ORAL
COMMUNITY

## 2025-08-06 ASSESSMENT — PATIENT HEALTH QUESTIONNAIRE - PHQ9
1. LITTLE INTEREST OR PLEASURE IN DOING THINGS: NOT AT ALL
2. FEELING DOWN, DEPRESSED OR HOPELESS: SEVERAL DAYS
SUM OF ALL RESPONSES TO PHQ QUESTIONS 1-9: 1

## 2025-08-06 ASSESSMENT — ENCOUNTER SYMPTOMS
SHORTNESS OF BREATH: 0
ALLERGIC/IMMUNOLOGIC NEGATIVE: 1
GASTROINTESTINAL NEGATIVE: 1
EYES NEGATIVE: 1

## (undated) DEVICE — 4-PORT MANIFOLD: Brand: NEPTUNE 2

## (undated) DEVICE — BANDAGE COBAN 4 IN COMPR W4INXL5YD FOAM COHESIVE QUIK STK SELF ADH SFT

## (undated) DEVICE — SUTURE VCRL SZ 3-0 L27IN ABSRB UD L24MM PS-1 3/8 CIR PRIM J936H

## (undated) DEVICE — BOWL MIXING VAC PALABOWL PALACOS

## (undated) DEVICE — GLOVE SURG 7 BIOGEL PI ULTRATOUCH G

## (undated) DEVICE — STRYKER PERFORMANCE SERIES SAGITTAL BLADE: Brand: STRYKER PERFORMANCE SERIES

## (undated) DEVICE — ATTUNE SOLO PINNING SYSTEM

## (undated) DEVICE — GLOVE ORANGE PI 7   MSG9070

## (undated) DEVICE — 450 ML BOTTLE OF 0.05% CHLORHEXIDINE GLUCONATE IN 99.95% STERILE WATER FOR IRRIGATION, USP AND APPLICATOR.: Brand: IRRISEPT ANTIMICROBIAL WOUND LAVAGE

## (undated) DEVICE — GLOVE SURG SZ 65 THK91MIL LTX FREE SYN POLYISOPRENE

## (undated) DEVICE — INSULATED BLADE ELECTRODE: Brand: EDGE

## (undated) DEVICE — THE STERILE LIGHT HANDLE COVER IS USED WITH STERIS SURGICAL LIGHTING AND VISUALIZATION SYSTEMS.

## (undated) DEVICE — GARMENT COMPR M FOR 13IN FT INTMIT SGL BLDR HEM FORC II

## (undated) DEVICE — TOTAL KNEE PACK: Brand: MEDLINE INDUSTRIES, INC.

## (undated) DEVICE — SPONGE LAP W18XL18IN WHT COT 4 PLY FLD STRUNG RADPQ DISP ST

## (undated) DEVICE — COVER,TABLE,HEAVY DUTY,77"X90",STRL: Brand: MEDLINE

## (undated) DEVICE — GOWN,PRECEPT,XLNG/XXLARGE,STRL: Brand: MEDLINE

## (undated) DEVICE — POWDER HEMOSTAT GEL 3.0GR -- SURGICEL

## (undated) DEVICE — DRESSING ANTIMIC FOAM OPTIFOAM POSTOP ADH 4X14 IN

## (undated) DEVICE — HYPODERMIC SAFETY NEEDLE: Brand: MAGELLAN

## (undated) DEVICE — SOL IRR NACL 0.9% 500ML POUR --

## (undated) DEVICE — SHEET,DRAPE,70X100,STERILE: Brand: MEDLINE

## (undated) DEVICE — WRAP KNEE UNIV E STRP HK AND LOOP W/ GEL PK MEDCOOL

## (undated) DEVICE — BNDG,ELSTC,MATRIX,STRL,6"X5YD,LF,HOOK&LP: Brand: MEDLINE

## (undated) DEVICE — SUTURE VCRL + SZ 0 L27IN ANTIBACTERIAL POLYGLACTIN 910 W VCP280H

## (undated) DEVICE — 3M™ IOBAN™ 2 ANTIMICROBIAL INCISE DRAPE 6650EZ: Brand: IOBAN™ 2

## (undated) DEVICE — GLOVE SURG UNDERGLOVE 7.5 PF BLU BIOGEL PI MIC LF

## (undated) DEVICE — ZIMMER® STERILE DISPOSABLE TOURNIQUET CUFF WITH PLC, DUAL PORT, SINGLE BLADDER, 34 IN. (86 CM)

## (undated) DEVICE — GLOVE SURG SZ 65 L12IN FNGR THK79MIL GRN LTX FREE

## (undated) DEVICE — INTENDED FOR TISSUE SEPARATION, AND OTHER PROCEDURES THAT REQUIRE A SHARP SURGICAL BLADE TO PUNCTURE OR CUT.: Brand: BARD-PARKER SAFETY BLADES SIZE 10, STERILE

## (undated) DEVICE — GOWN,AURORA,FABRIC-REINFORCED,X-LARGE: Brand: MEDLINE

## (undated) DEVICE — GLOVE SURG SZ 75 L12IN FNGR THK79MIL GRN LTX FREE

## (undated) DEVICE — BLADE SAW W12.5XL70MM THK1MM RECIP DBL SIDE OFFSET

## (undated) DEVICE — TOWEL,OR,DSP,ST,BLUE,STD,4/PK,20PK/CS: Brand: MEDLINE

## (undated) DEVICE — DRAPE,TOP,102X53,STERILE: Brand: MEDLINE

## (undated) DEVICE — 3M™ TEGADERM™ HP TRANSPARENT FILM DRESSING FRAME STYLE, 9546HP, 4 IN X 4-1/2 IN (10 CM X 11.5 CM), 50/CT 4CT/CASE: Brand: 3M™ TEGADERM™

## (undated) DEVICE — SUTURE VCRL + SZ 1 L27IN ANTIBACTERIAL POLYGLACTIN 910 W VCP281H

## (undated) DEVICE — SUT VCRL + 2-0 27IN CT2 UD -- 36/BX

## (undated) DEVICE — PREP SKN CHLRAPRP APL 26ML STR --

## (undated) DEVICE — SYSTEM SKIN CLSR 60CM 2-OCTYL CYNOACRLT W/ MESH DISPNS

## (undated) DEVICE — HOOD WITH PEEL AWAY FACE SHIELD: Brand: T7PLUS

## (undated) DEVICE — BLADE ELECTRODE: Brand: VALLEYLAB

## (undated) DEVICE — (D)HANDPIECE IRR W/HI FLO TIP -- DUPLICATE USE ITEM 121586